# Patient Record
Sex: MALE | Race: WHITE
[De-identification: names, ages, dates, MRNs, and addresses within clinical notes are randomized per-mention and may not be internally consistent; named-entity substitution may affect disease eponyms.]

---

## 2019-01-02 ENCOUNTER — HOSPITAL ENCOUNTER (OUTPATIENT)
Dept: HOSPITAL 47 - RADMRIMAIN | Age: 79
End: 2019-01-02
Attending: INTERNAL MEDICINE
Payer: MEDICARE

## 2019-01-02 DIAGNOSIS — M43.16: ICD-10-CM

## 2019-01-02 DIAGNOSIS — M48.061: Primary | ICD-10-CM

## 2019-01-02 DIAGNOSIS — M99.73: ICD-10-CM

## 2019-01-02 DIAGNOSIS — M51.27: ICD-10-CM

## 2019-01-02 PROCEDURE — 72148 MRI LUMBAR SPINE W/O DYE: CPT

## 2019-01-02 NOTE — MR
EXAMINATION TYPE: MR lumbar spine wo con

 

DATE OF EXAM: 1/2/2019

 

COMPARISON: None

 

HISTORY: Spinal stenosis, lumbar region, pain

 

CONTRAST: 0 mL intravenous Gadavist.

 

TECHNIQUE: 

Multiplanar, multisequence images of the lumbar spine were acquired.

 

FINDINGS:  

L5-S1: There is a large central disc herniation with mild anterior thecal sac compression.   Spinal c
anal stenosis is present at 0.8 cm. Moderate right and severe left foraminal stenosis is present.

 

L4-L5: There is narrowing of the disc height. Very minimal disc bulge has anterior thecal sac contact
. No spinal canal stenosis present. Left facet hypertrophy is present there is moderate to severe lef
t foraminal stenosis and very mild right foraminal stenosis at this level.

 

L3-L4: Disc uncovering is present with mild anterior thecal sac compression. Facet hypertrophy with l
igamentum flavum laxity is posterior lateral thecal sac compression. There may be some lateral canal 
narrowing. Very mild grade 1 spondylolisthesis of L3 anterior and L4 is present. Posterior disc space
 narrowing is present. There is severe bilateral foraminal stenosis.

 

L2-L3: Broad-based disc bulge is present. This is slightly more focal centrally and may has some mild
 subligamentous disc extension beyond the endplate of L3. No AP spinal canal stenosis present. Poster
ior lateral thecal sac compression from facet hypertrophy and ligamentum flavum laxity is present. Fo
ramen are patent.

 

L1-L2: There is a right paracentral disc bulge which has mild anterior thecal sac compression. No spi
nal canal stenosis present. Facet hypertrophy with mild ligamentum flavum laxity is present. Neural f
oramen are patent.

 

T12-L1: No significant disc bulge or disc herniation.  No spinal canal stenosis.  No foraminal stenos
is. 

 

 

 

IMPRESSION:

1. Large central disc herniation L5-S1 with mild anterior thecal sac compression contributing to spin
al canal stenosis.

2. Moderate to severe bilateral foraminal stenosis L4-5, L5-S1 and L3-4.

3. Grade 1 spondylolisthesis of L3 anterior and L4. Disc uncovering is contributing to mild anterior 
thecal sac compression. Ligamentum flavum laxity is contributing to lateral canal narrowing.

4. Subligamentous disc extension at the L2-L3 level with mild anterior thecal sac compression.

5. Right paracentral focal bulge with mild anterior thecal sac compression.

## 2021-05-04 ENCOUNTER — HOSPITAL ENCOUNTER (OUTPATIENT)
Dept: HOSPITAL 47 - EC | Age: 81
Setting detail: OBSERVATION
LOS: 1 days | Discharge: HOME | End: 2021-05-05
Attending: INTERNAL MEDICINE | Admitting: INTERNAL MEDICINE
Payer: MEDICARE

## 2021-05-04 DIAGNOSIS — E86.1: ICD-10-CM

## 2021-05-04 DIAGNOSIS — Z81.2: ICD-10-CM

## 2021-05-04 DIAGNOSIS — Z82.5: ICD-10-CM

## 2021-05-04 DIAGNOSIS — Z79.899: ICD-10-CM

## 2021-05-04 DIAGNOSIS — Z98.890: ICD-10-CM

## 2021-05-04 DIAGNOSIS — Z20.822: ICD-10-CM

## 2021-05-04 DIAGNOSIS — K59.00: ICD-10-CM

## 2021-05-04 DIAGNOSIS — K57.90: ICD-10-CM

## 2021-05-04 DIAGNOSIS — E66.9: ICD-10-CM

## 2021-05-04 DIAGNOSIS — K92.1: Primary | ICD-10-CM

## 2021-05-04 DIAGNOSIS — Z80.0: ICD-10-CM

## 2021-05-04 DIAGNOSIS — H40.9: ICD-10-CM

## 2021-05-04 DIAGNOSIS — N39.0: ICD-10-CM

## 2021-05-04 DIAGNOSIS — H26.9: ICD-10-CM

## 2021-05-04 DIAGNOSIS — N40.1: ICD-10-CM

## 2021-05-04 LAB
ALBUMIN SERPL-MCNC: 3.8 G/DL (ref 3.5–5)
ALP SERPL-CCNC: 122 U/L (ref 38–126)
ALT SERPL-CCNC: 28 U/L (ref 4–49)
ANION GAP SERPL CALC-SCNC: 8 MMOL/L
APTT BLD: 21.6 SEC (ref 22–30)
AST SERPL-CCNC: 29 U/L (ref 17–59)
BASOPHILS # BLD AUTO: 0 K/UL (ref 0–0.2)
BASOPHILS NFR BLD AUTO: 0 %
BUN SERPL-SCNC: 14 MG/DL (ref 9–20)
CALCIUM SPEC-MCNC: 9.9 MG/DL (ref 8.4–10.2)
CHLORIDE SERPL-SCNC: 106 MMOL/L (ref 98–107)
CO2 SERPL-SCNC: 25 MMOL/L (ref 22–30)
EOSINOPHIL # BLD AUTO: 0.4 K/UL (ref 0–0.7)
EOSINOPHIL NFR BLD AUTO: 4 %
ERYTHROCYTE [DISTWIDTH] IN BLOOD BY AUTOMATED COUNT: 4.66 M/UL (ref 4.3–5.9)
ERYTHROCYTE [DISTWIDTH] IN BLOOD: 14.6 % (ref 11.5–15.5)
GLUCOSE SERPL-MCNC: 101 MG/DL (ref 74–99)
HCT VFR BLD AUTO: 40.7 % (ref 39–53)
HGB BLD-MCNC: 14 GM/DL (ref 13–17.5)
INR PPP: 1 (ref ?–1.2)
LYMPHOCYTES # SPEC AUTO: 1.6 K/UL (ref 1–4.8)
LYMPHOCYTES NFR SPEC AUTO: 15 %
MCH RBC QN AUTO: 30 PG (ref 25–35)
MCHC RBC AUTO-ENTMCNC: 34.4 G/DL (ref 31–37)
MCV RBC AUTO: 87.3 FL (ref 80–100)
MONOCYTES # BLD AUTO: 0.6 K/UL (ref 0–1)
MONOCYTES NFR BLD AUTO: 5 %
NEUTROPHILS # BLD AUTO: 8 K/UL (ref 1.3–7.7)
NEUTROPHILS NFR BLD AUTO: 75 %
PLATELET # BLD AUTO: 222 K/UL (ref 150–450)
POTASSIUM SERPL-SCNC: 4.4 MMOL/L (ref 3.5–5.1)
PROT SERPL-MCNC: 6.6 G/DL (ref 6.3–8.2)
PT BLD: 10.6 SEC (ref 9–12)
SODIUM SERPL-SCNC: 139 MMOL/L (ref 137–145)
WBC # BLD AUTO: 10.8 K/UL (ref 3.8–10.6)

## 2021-05-04 PROCEDURE — 85610 PROTHROMBIN TIME: CPT

## 2021-05-04 PROCEDURE — 85025 COMPLETE CBC W/AUTO DIFF WBC: CPT

## 2021-05-04 PROCEDURE — 36415 COLL VENOUS BLD VENIPUNCTURE: CPT

## 2021-05-04 PROCEDURE — 99285 EMERGENCY DEPT VISIT HI MDM: CPT

## 2021-05-04 PROCEDURE — 96374 THER/PROPH/DIAG INJ IV PUSH: CPT

## 2021-05-04 PROCEDURE — 85730 THROMBOPLASTIN TIME PARTIAL: CPT

## 2021-05-04 PROCEDURE — 82272 OCCULT BLD FECES 1-3 TESTS: CPT

## 2021-05-04 PROCEDURE — 86901 BLOOD TYPING SEROLOGIC RH(D): CPT

## 2021-05-04 PROCEDURE — 86850 RBC ANTIBODY SCREEN: CPT

## 2021-05-04 PROCEDURE — 87636 SARSCOV2 & INF A&B AMP PRB: CPT

## 2021-05-04 PROCEDURE — 80053 COMPREHEN METABOLIC PANEL: CPT

## 2021-05-04 PROCEDURE — 86900 BLOOD TYPING SEROLOGIC ABO: CPT

## 2021-05-04 PROCEDURE — 83605 ASSAY OF LACTIC ACID: CPT

## 2021-05-04 PROCEDURE — 84484 ASSAY OF TROPONIN QUANT: CPT

## 2021-05-04 PROCEDURE — 96361 HYDRATE IV INFUSION ADD-ON: CPT

## 2021-05-04 RX ADMIN — CEFAZOLIN SCH: 330 INJECTION, POWDER, FOR SOLUTION INTRAMUSCULAR; INTRAVENOUS at 15:35

## 2021-05-04 RX ADMIN — LACTULOSE SCH GM: 20 SOLUTION ORAL at 17:32

## 2021-05-04 RX ADMIN — LACTULOSE SCH: 20 SOLUTION ORAL at 21:32

## 2021-05-04 RX ADMIN — LACTULOSE SCH GM: 20 SOLUTION ORAL at 21:30

## 2021-05-04 RX ADMIN — HYDROCORTISONE ACETATE SCH MG: 25 SUPPOSITORY RECTAL at 11:40

## 2021-05-04 RX ADMIN — PANTOPRAZOLE SODIUM SCH MG: 40 INJECTION, POWDER, FOR SOLUTION INTRAVENOUS at 08:48

## 2021-05-04 NOTE — P.CONS
History of Present Illness





- Reason for Consult


Consult date: 05/04/21


Lower GI bleed


Requesting physician: Josse Powers





- Chief Complaint


Rectal bleeding





- History of Present Illness





This is a pleasant 81-year-old white male presented to the emergency room after 

he had 2 episodes of bright red blood per rectum after having a bowel movement. 

Patient states he has problems with constipation due to enlarged prostate.  He 

states he has to really push to have a bowel movement and yesterday he gave 

himself an enema followed by his daughter giving him castor oil to have a bowel 

movement.  He states he had 2 bowel movements and then later noted when he sat 

on the toilet he had a small amount of bright red blood dripping in the toilet 

that stopped after wiping.  He states he Got up at 2 AM to Go to the Bathroom 

and Again Saw a Small Amount of Bleeding That Stopped Right Away.  He eats the 

bleeding was painless, had no abdominal cramping or pain.  No pain in the 

rectum.  Has a history of diverticulitis for which he underwent a colonoscopy in

2019 at [facility] with Dr. Bernard, report not available at this time.  Eyes 

any anticoagulation or NSAID use.  He is currently on Keflex for which Dr. Melo

has prescribed him for a urinary tract infection as well as watching his 

prostate.  His Hemoccult stool was negative and his presenting labs were WBC 

10.8, hemoglobin 14.0, hematocrit 40.0, platelets 222,000 liver enzymes 

unremarkable, BUN and creatinine normal limits.





Review of Systems


Constitutional: Denies chills, Denies fever


Ears, nose, mouth and throat: Denies headache, Denies sore throat


Cardiovascular: Denies chest pain, Denies shortness of breath


Respiratory: Denies cough


Gastrointestinal: Reports BRBPR, Reports change in bowel habits, Reports 

constipation, Denies abdominal pain, Denies bloating, Denies coffee ground 

emesis, Denies melena, Denies nausea, Denies vomiting


Genitourinary: Reports urinary hesitancy, Reports urinary retention


Musculoskeletal: Denies myalgias


Integumentary: Denies pruritus, Denies rash


Neurological: Denies numbness, Denies weakness





Past Medical History


Past Medical History: No Reported History, Eye Disorder


Additional Past Medical History / Comment(s): Pt states he had recent 

hospitalization d/t prostate/urinary flow issues and UTI/hematuria-pt states he 

had IDC x 11 days and will be following up with Dr. Stroud, BPH, diverticular dx,

bilateral eye cataracts/glaucoma.


History of Any Multi-Drug Resistant Organisms: None Reported


Past Surgical History: Back Surgery, Tonsillectomy


Additional Past Surgical History / Comment(s): Lumbar spine surgery with Coflex 

clip, colonoscopy


Past Anesthesia/Blood Transfusion Reactions: No Reported Reaction


Smoking Status: Never smoker





- Past Family History


  ** Father


Family Medical History: COPD


Additional Family Medical History / Comment(s): Father was a smoker. He lived t 

be 83 yrs old.





  ** Mother


Family Medical History: Cancer


Additional Family Medical History / Comment(s): Colon cancer.  Mother is 98yrs 

old.





Medications and Allergies


                                Home Medications











 Medication  Instructions  Recorded  Confirmed  Type


 


Cephalexin [Keflex] 500 mg PO BID 05/04/21 05/04/21 History


 


Latanoprost/Pf [Latanoprost 0.005% 1 drop BOTH EYES HS 05/04/21 05/04/21 History





Eye Drop]    








                                    Allergies











Allergy/AdvReac Type Severity Reaction Status Date / Time


 


No Known Allergies Allergy   Verified 05/04/21 07:21














Physical Exam


Vitals: 


                                   Vital Signs











  Temp Pulse Resp BP Pulse Ox


 


 05/04/21 11:21  98.0 F  86  18  132/84  95


 


 05/04/21 08:52   94  18  130/74  98


 


 05/04/21 07:00   97  18  128/62  99


 


 05/04/21 03:30  97.8 F  117 H  20  130/65  96








                                Intake and Output











 05/03/21 05/04/21 05/04/21





 22:59 06:59 14:59


 


Other:   


 


  Weight  77.111 kg 77.111 kg














General appearance: The patient is alert, oriented, in no acute distress.


HET: Head is normocephalic and atraumatic.    Oropharynx is clear without 

lesions.


Neck: Supple without lymphadenopathy.  Trachea midline.


Heart: S1 S2.  Regular rate and rhythm.


Lungs: They're to auscultation.


Abdomen: Soft, nontender, nondistended with  bowel sounds.  No peritoneal signs.

 No palpable organomegaly or masses.


Extremities: Normal skin color and turgor.  No pedal edema


Neurological: No focal deficits.  Alert and oriented 3.





Results


CBC & Chem 7: 


                                 05/04/21 04:40





                                 05/04/21 04:40


Labs: 


                  Abnormal Lab Results - Last 24 Hours (Table)











  05/04/21 05/04/21 05/04/21 Range/Units





  04:40 04:40 04:40 


 


WBC  10.8 H    (3.8-10.6)  k/uL


 


Neutrophils #  8.0 H    (1.3-7.7)  k/uL


 


APTT   21.6 L   (22.0-30.0)  sec


 


Glucose    101 H  (74-99)  mg/dL














Assessment and Plan


(1) Lower GI bleeding


Narrative/Plan: 


This is a pleasant 81-year-old male patient who presented to the emergency room 

with complaints of 2 episodes of bleeding from the rectum which he states was 

darker in color.  He states he has been suffering from constipation and has to 

struggle and strain to have a bowel movement.  Yesterday he gave himself an 

enema for which he states he had a bowel movement however had to strain 

significantly.  His daughter then came over and gave him some castor oil which 

he also had another bowel movement.  He denies any blood being mixed in with the

stool.  Later that day felt like he had to go the bathroom again and when he sat

down on the toilet he noticed a couple small drops of blood in the toilet which 

he states was dark, this again happened at 2 AM.  He states that it is painless,

he denied any abdominal pain or cramping.  He does also state that he has a 

history of diverticulitis for which he underwent a colonoscopy in 2019 by Dr. Bernard, however the report is not available at this time.  He had a stable 

hemoglobin of 14.0 as well as a negative occult stool.  We dealing with 

hemorrhoidal were fissure due to straining and constipation.  However cannot 

exclude possibility of diverticular bleed with patient's history of 

diverticulosis.


Current Visit: Yes   Status: Acute   Code(s): K92.2 - GASTROINTESTINAL 

HEMORRHAGE, UNSPECIFIED   SNOMED Code(s): 36711903


   


Plan: 





1.  Patient may have regular diet


2.  Repeat CBC in the a.m.


3.  Will order Anusol suppository


4.  Discussed using MiraLAX daily and up to twice daily as needed for 

constipation


5.  No plans on endoscopic evaluation at this time patient had recent col

onoscopy, can try to get records.  


6.  Recommend patient have follow-up with gastroenterology regarding management 

of chronic constipation


Thank you for this consultation, we will continue to follow.  Patient may be 

discharged home from a gastroenterology standpoint if otherwise deemed medically

clear.





Dr. RAYNE Byrd


I agree with the dictator's note, documented as a scribe by Antonia TOUSSAINT.

## 2021-05-04 NOTE — P.HPIM
History of Present Illness


H&P Date: 05/04/21


Chief Complaint: Hematochezia


This is an 81-year-old white male who reported to the hospital because of 

passing blood in the stool.  He describes the blood as dark and it happened 

twice since last night.  He denies dizziness or loss of consciousness.  He 

denies chest pain, no abdominal pain, no hematuria or dysuria.  He denies 

subjective fever or chills.  At the time of examination patient is in bed, he 

does not appear to be in distress.  He denies chest pain.








Review of Systems


Pertinent positive and negative findings as in HPI.  No chest pain, no abdominal

pain.  Positive for hematochezia.








Past Medical History


Past Medical History: No Reported History, Eye Disorder


Additional Past Medical History / Comment(s): Pt states he had recent 

hospitalization d/t prostate/urinary flow issues and UTI/hematuria-pt states he 

had IDC x 11 days and will be following up with Dr. Stroud, BPH, diverticular dx,

bilateral eye cataracts/glaucoma.


History of Any Multi-Drug Resistant Organisms: None Reported


Past Surgical History: Back Surgery, Tonsillectomy


Additional Past Surgical History / Comment(s): Lumbar spine surgery with Coflex 

clip, colonoscopy


Past Anesthesia/Blood Transfusion Reactions: No Reported Reaction


Smoking Status: Never smoker





- Past Family History


  ** Father


Family Medical History: COPD


Additional Family Medical History / Comment(s): Father was a smoker. He lived t 

be 83 yrs old.





  ** Mother


Family Medical History: Cancer


Additional Family Medical History / Comment(s): Colon cancer.  Mother is 98yrs 

old.





Medications and Allergies


                                Home Medications











 Medication  Instructions  Recorded  Confirmed  Type


 


Cephalexin [Keflex] 500 mg PO BID 05/04/21 05/04/21 History


 


Latanoprost/Pf [Latanoprost 0.005% 1 drop BOTH EYES HS 05/04/21 05/04/21 History





Eye Drop]    








                                    Allergies











Allergy/AdvReac Type Severity Reaction Status Date / Time


 


No Known Allergies Allergy   Verified 05/04/21 07:21














Physical Exam


Vitals: 


                                   Vital Signs











  Temp Pulse Resp BP Pulse Ox


 


 05/04/21 11:21  98.0 F  86  18  132/84  95


 


 05/04/21 08:52   94  18  130/74  98


 


 05/04/21 07:00   97  18  128/62  99


 


 05/04/21 03:30  97.8 F  117 H  20  130/65  96








                                Intake and Output











 05/03/21 05/04/21 05/04/21





 22:59 06:59 14:59


 


Other:   


 


  Weight  77.111 kg 77.111 kg











Constitutional: No acute distress, conversant, pleasant


Eyes: Anicteric sclerae, moist conjunctiva


ENMT: NC/AT,Oropharynx clear


Neck:Supple, FROM


Lungs: Clear to auscultation, Clear to percussion, Normal respiratory effort, no

accessory muscle use 


Cardiovascular: Heart regular in rate and rhythm,  No murmurs, gallops, or rubs 

no peripheral edema


Abdominal: Soft Nontender, non distended, no guarding, no rebound or  rigidity, 

Normoactive bowel sounds   No palpable mass No abdominal wall hernia noted 


Skin: Normal temperature, tone, texture, turgor, No induration No subcutaneous 

nodules, No rash, lesions, No ulcers


Extremities:No digital cyanosis No clubbing, Pedal pulses intact and  

symmetrical Radial pulses intact and symmetrical Normal gait and station, No 

calf tenderness


Psychiatric: Alert and oriented to person, place and time, Appropriate affect 

Intact judgement         


Neuro: Muscles Strength 5/5 in all 4 extremities, Sensation to light touch 

grossly present throughout, Cranial nerves II-XII grossly intact. No focal 

sensory deficits








Results


CBC & Chem 7: 


                                 05/04/21 04:40





                                 05/04/21 04:40


Labs: 


                  Abnormal Lab Results - Last 24 Hours (Table)











  05/04/21 05/04/21 05/04/21 Range/Units





  04:40 04:40 04:40 


 


WBC  10.8 H    (3.8-10.6)  k/uL


 


Neutrophils #  8.0 H    (1.3-7.7)  k/uL


 


APTT   21.6 L   (22.0-30.0)  sec


 


Glucose    101 H  (74-99)  mg/dL














Thrombosis Risk Factor Assmnt





- Choose All That Apply


Any of the Below Risk Factors Present?: Yes


Each Factor Represents 1 point: Obesity (BMI >25)


Other Risk Factors: Yes


Each Risk Factor Represents 3 Points: Age 75 years or older


Thrombosis Risk Factor Assessment Total Risk Factor Score: 4


Thrombosis Risk Factor Assessment Level: Moderate Risk





Assessment and Plan


Assessment: 


1.  Acute GI bleed, likely lower, unknown etiology, monitor H&H, hemoglobin 14, 

transfuse as indicated.  Start PPI and IV fluids, GI consultation.  Patient 

states that he had a colonoscopy in 2019.


2.  Hypovolemia: Start normal saline at 75 mL/h


DVT prophylaxis: SCDs


Disposition: Home in 1-2 days

## 2021-05-04 NOTE — ED
GI Bleed HPI





- General


Chief complaint: Recheck/Abnormal Lab/Rx


Stated complaint: rectal bleeding


Time Seen by Provider: 05/04/21 03:39


Source: patient


Mode of arrival: ambulatory


Limitations: no limitations





- History of Present Illness


Initial comments: 





this patient is an 81-year-old man who presents to be evaluated for passing some

dark red blood with his stool.  The patient states that this it come on tonight.

 He had been having some some difficulty with constipation after recently being 

in the hospital for prostate issues.  He states that he had taken a number of 

remedies to help have bowel movement and then when he did pass some stool tonigh

t there was some dark blood present with hard stool.  He also had what appeared 

to be some blood mixed in the stool.  Patient states there was no pain in the 

abdomen.  No perianal pain.  He denies any signs or symptoms of anemia.  No 

chest pain, palpitations, lightheadedness, dyspnea or syncope.  Patient is not 

taking any blood thinning medications.


MD complaint: blood streaked stool


-: hour(s)


Severity scale (1-10): 0


Quality: painless


Consistency: constant


Improves with: none


Worsens with: none


Associated Symptoms: denies other symptoms





- Related Data


                                    Allergies











Allergy/AdvReac Type Severity Reaction Status Date / Time


 


No Known Allergies Allergy   Verified 05/04/21 03:32














Review of Systems


ROS Statement: 


Those systems with pertinent positive or pertinent negative responses have been 

documented in the HPI.





ROS Other: All systems not noted in ROS Statement are negative.


Constitutional: Denies: fever, chills


Respiratory: Denies: cough, dyspnea


Cardiovascular: Denies: chest pain, palpitations, edema


Gastrointestinal: Reports: constipation, melena.  Denies: abdominal pain, 

nausea, vomiting, diarrhea, hematemesis, hematochezia


Genitourinary: Denies: dysuria, hematuria, testicular pain


Musculoskeletal: Denies: back pain


Skin: Denies: rash


Neurological: Denies: headache, weakness, numbness





Past Medical History


Past Medical History: No Reported History


History of Any Multi-Drug Resistant Organisms: None Reported


Past Surgical History: No Surgical Hx Reported


Past Psychological History: No Psychological Hx Reported


Smoking Status: Never smoker


Past Alcohol Use History: None Reported


Past Drug Use History: None Reported





General Exam


Limitations: no limitations


General appearance: alert, in no apparent distress


Head exam: Present: atraumatic, normocephalic


Eye exam: Present: normal appearance.  Absent: scleral icterus, conjunctival 

injection


ENT exam: Present: normal oropharynx


Neck exam: Present: normal inspection


Respiratory exam: Present: normal lung sounds bilaterally.  Absent: respiratory 

distress, wheezes, rales, rhonchi, stridor


Cardiovascular Exam: Present: regular rate, normal rhythm, normal heart sounds. 

Absent: systolic murmur, diastolic murmur, rubs, gallop


GI/Abdominal exam: Present: soft.  Absent: distended, tenderness, guarding, 

rebound, rigid, mass, pulsatile mass, hernia


Rectal exam: Present: normal inspection, normal rectal tone.  Absent: black 

stool, bloody stool, fecal impaction, hemorrhoids, mass, tenderness, prostate 

tenderness


Extremities exam: Present: normal inspection, normal capillary refill.  Absent: 

pedal edema, calf tenderness


Back exam: Present: normal inspection.  Absent: CVA tenderness (R), CVA 

tenderness (L)


Neurological exam: Present: alert


Skin exam: Present: warm, dry, intact, normal color.  Absent: rash





Course


                                   Vital Signs











  05/04/21 05/04/21





  03:30 07:00


 


Temperature 97.8 F 


 


Pulse Rate 117 H 97


 


Respiratory 20 18





Rate  


 


Blood Pressure 130/65 128/62


 


O2 Sat by Pulse 96 99





Oximetry  














Medical Decision Making





- Lab Data


Result diagrams: 


                                 05/04/21 04:40





                                 05/04/21 04:40


                                   Lab Results











  05/04/21 05/04/21 05/04/21 Range/Units





  04:40 04:40 04:40 


 


WBC  10.8 H    (3.8-10.6)  k/uL


 


RBC  4.66    (4.30-5.90)  m/uL


 


Hgb  14.0    (13.0-17.5)  gm/dL


 


Hct  40.7    (39.0-53.0)  %


 


MCV  87.3    (80.0-100.0)  fL


 


MCH  30.0    (25.0-35.0)  pg


 


MCHC  34.4    (31.0-37.0)  g/dL


 


RDW  14.6    (11.5-15.5)  %


 


Plt Count  222    (150-450)  k/uL


 


MPV  7.6    


 


Neutrophils %  75    %


 


Lymphocytes %  15    %


 


Monocytes %  5    %


 


Eosinophils %  4    %


 


Basophils %  0    %


 


Neutrophils #  8.0 H    (1.3-7.7)  k/uL


 


Lymphocytes #  1.6    (1.0-4.8)  k/uL


 


Monocytes #  0.6    (0-1.0)  k/uL


 


Eosinophils #  0.4    (0-0.7)  k/uL


 


Basophils #  0.0    (0-0.2)  k/uL


 


PT    10.6  (9.0-12.0)  sec


 


INR    1.0  (<1.2)  


 


APTT    21.6 L  (22.0-30.0)  sec


 


Sodium     (137-145)  mmol/L


 


Potassium     (3.5-5.1)  mmol/L


 


Chloride     ()  mmol/L


 


Carbon Dioxide     (22-30)  mmol/L


 


Anion Gap     mmol/L


 


BUN     (9-20)  mg/dL


 


Creatinine     (0.66-1.25)  mg/dL


 


Est GFR (CKD-EPI)AfAm     (>60 ml/min/1.73 sqM)  


 


Est GFR (CKD-EPI)NonAf     (>60 ml/min/1.73 sqM)  


 


Glucose     (74-99)  mg/dL


 


Plasma Lactic Acid Moses     (0.7-2.0)  mmol/L


 


Calcium     (8.4-10.2)  mg/dL


 


Total Bilirubin     (0.2-1.3)  mg/dL


 


AST     (17-59)  U/L


 


ALT     (4-49)  U/L


 


Alkaline Phosphatase     ()  U/L


 


Troponin I     (0.000-0.034)  ng/mL


 


Total Protein     (6.3-8.2)  g/dL


 


Albumin     (3.5-5.0)  g/dL


 


Stool Occult Blood   Negative   (Negative)  


 


Blood Type     


 


Blood Type Confirm     


 


Blood Type Recheck     


 


Bld Type Recheck Status     


 


Antibody Screen     


 


Spec Expiration Date     














  05/04/21 05/04/21 05/04/21 Range/Units





  04:40 04:40 04:40 


 


WBC     (3.8-10.6)  k/uL


 


RBC     (4.30-5.90)  m/uL


 


Hgb     (13.0-17.5)  gm/dL


 


Hct     (39.0-53.0)  %


 


MCV     (80.0-100.0)  fL


 


MCH     (25.0-35.0)  pg


 


MCHC     (31.0-37.0)  g/dL


 


RDW     (11.5-15.5)  %


 


Plt Count     (150-450)  k/uL


 


MPV     


 


Neutrophils %     %


 


Lymphocytes %     %


 


Monocytes %     %


 


Eosinophils %     %


 


Basophils %     %


 


Neutrophils #     (1.3-7.7)  k/uL


 


Lymphocytes #     (1.0-4.8)  k/uL


 


Monocytes #     (0-1.0)  k/uL


 


Eosinophils #     (0-0.7)  k/uL


 


Basophils #     (0-0.2)  k/uL


 


PT     (9.0-12.0)  sec


 


INR     (<1.2)  


 


APTT     (22.0-30.0)  sec


 


Sodium  139    (137-145)  mmol/L


 


Potassium  4.4    (3.5-5.1)  mmol/L


 


Chloride  106    ()  mmol/L


 


Carbon Dioxide  25    (22-30)  mmol/L


 


Anion Gap  8    mmol/L


 


BUN  14    (9-20)  mg/dL


 


Creatinine  0.85    (0.66-1.25)  mg/dL


 


Est GFR (CKD-EPI)AfAm  >90    (>60 ml/min/1.73 sqM)  


 


Est GFR (CKD-EPI)NonAf  81    (>60 ml/min/1.73 sqM)  


 


Glucose  101 H    (74-99)  mg/dL


 


Plasma Lactic Acid Moses   1.3   (0.7-2.0)  mmol/L


 


Calcium  9.9    (8.4-10.2)  mg/dL


 


Total Bilirubin  1.0    (0.2-1.3)  mg/dL


 


AST  29    (17-59)  U/L


 


ALT  28    (4-49)  U/L


 


Alkaline Phosphatase  122    ()  U/L


 


Troponin I    <0.012  (0.000-0.034)  ng/mL


 


Total Protein  6.6    (6.3-8.2)  g/dL


 


Albumin  3.8    (3.5-5.0)  g/dL


 


Stool Occult Blood     (Negative)  


 


Blood Type     


 


Blood Type Confirm     


 


Blood Type Recheck     


 


Bld Type Recheck Status     


 


Antibody Screen     


 


Spec Expiration Date     














  05/04/21 05/04/21 Range/Units





  04:40 05:49 


 


WBC    (3.8-10.6)  k/uL


 


RBC    (4.30-5.90)  m/uL


 


Hgb    (13.0-17.5)  gm/dL


 


Hct    (39.0-53.0)  %


 


MCV    (80.0-100.0)  fL


 


MCH    (25.0-35.0)  pg


 


MCHC    (31.0-37.0)  g/dL


 


RDW    (11.5-15.5)  %


 


Plt Count    (150-450)  k/uL


 


MPV    


 


Neutrophils %    %


 


Lymphocytes %    %


 


Monocytes %    %


 


Eosinophils %    %


 


Basophils %    %


 


Neutrophils #    (1.3-7.7)  k/uL


 


Lymphocytes #    (1.0-4.8)  k/uL


 


Monocytes #    (0-1.0)  k/uL


 


Eosinophils #    (0-0.7)  k/uL


 


Basophils #    (0-0.2)  k/uL


 


PT    (9.0-12.0)  sec


 


INR    (<1.2)  


 


APTT    (22.0-30.0)  sec


 


Sodium    (137-145)  mmol/L


 


Potassium    (3.5-5.1)  mmol/L


 


Chloride    ()  mmol/L


 


Carbon Dioxide    (22-30)  mmol/L


 


Anion Gap    mmol/L


 


BUN    (9-20)  mg/dL


 


Creatinine    (0.66-1.25)  mg/dL


 


Est GFR (CKD-EPI)AfAm    (>60 ml/min/1.73 sqM)  


 


Est GFR (CKD-EPI)NonAf    (>60 ml/min/1.73 sqM)  


 


Glucose    (74-99)  mg/dL


 


Plasma Lactic Acid Moses    (0.7-2.0)  mmol/L


 


Calcium    (8.4-10.2)  mg/dL


 


Total Bilirubin    (0.2-1.3)  mg/dL


 


AST    (17-59)  U/L


 


ALT    (4-49)  U/L


 


Alkaline Phosphatase    ()  U/L


 


Troponin I    (0.000-0.034)  ng/mL


 


Total Protein    (6.3-8.2)  g/dL


 


Albumin    (3.5-5.0)  g/dL


 


Stool Occult Blood    (Negative)  


 


Blood Type  O Positive   


 


Blood Type Confirm   O Positive  


 


Blood Type Recheck  No Previous Record   


 


Bld Type Recheck Status  CABO Indicated   


 


Antibody Screen  NEGATIVE   


 


Spec Expiration Date  05/07/2021 - 2340   














Disposition


Clinical Impression: 


 Lower GI bleeding





Disposition: ADMITTED AS IP TO THIS [facility]


Condition: Good


Is patient prescribed a controlled substance at d/c from ED?: No


Referrals: 


Asaf Jefferson MD [Primary Care Provider] - 1-2 days

## 2021-05-05 VITALS — RESPIRATION RATE: 16 BRPM

## 2021-05-05 VITALS — HEART RATE: 81 BPM | DIASTOLIC BLOOD PRESSURE: 76 MMHG | SYSTOLIC BLOOD PRESSURE: 122 MMHG | TEMPERATURE: 97.6 F

## 2021-05-05 LAB
ALBUMIN SERPL-MCNC: 3.6 G/DL (ref 3.8–4.9)
ALBUMIN/GLOB SERPL: 1.71 G/DL (ref 1.6–3.17)
ALP SERPL-CCNC: 110 U/L (ref 41–126)
ALT SERPL-CCNC: 28 U/L (ref 10–49)
ANION GAP SERPL CALC-SCNC: 9.8 MMOL/L (ref 4–12)
AST SERPL-CCNC: 25 U/L (ref 14–35)
BASOPHILS # BLD AUTO: 0.02 X 10*3/UL (ref 0–0.1)
BASOPHILS NFR BLD AUTO: 0.2 %
BUN SERPL-SCNC: 17 MG/DL (ref 9–27)
BUN/CREAT SERPL: 17 RATIO (ref 12–20)
CALCIUM SPEC-MCNC: 9.3 MG/DL (ref 8.7–10.3)
CHLORIDE SERPL-SCNC: 108 MMOL/L (ref 96–109)
CO2 SERPL-SCNC: 24.2 MMOL/L (ref 21.6–31.8)
EOSINOPHIL # BLD AUTO: 0.34 X 10*3/UL (ref 0.04–0.35)
EOSINOPHIL NFR BLD AUTO: 4.1 %
ERYTHROCYTE [DISTWIDTH] IN BLOOD BY AUTOMATED COUNT: 4.41 X 10*6/UL (ref 4.4–5.6)
ERYTHROCYTE [DISTWIDTH] IN BLOOD: 14.8 % (ref 11.5–14.5)
GLOBULIN SER CALC-MCNC: 2.1 G/DL (ref 1.6–3.3)
GLUCOSE SERPL-MCNC: 81 MG/DL (ref 70–110)
HCT VFR BLD AUTO: 40.1 % (ref 39.6–50)
HGB BLD-MCNC: 12.7 G/DL (ref 13–17)
LYMPHOCYTES # SPEC AUTO: 1.59 X 10*3/UL (ref 0.9–5)
LYMPHOCYTES NFR SPEC AUTO: 19.2 %
MCH RBC QN AUTO: 28.8 PG (ref 27–32)
MCHC RBC AUTO-ENTMCNC: 31.7 G/DL (ref 32–37)
MCV RBC AUTO: 90.9 FL (ref 80–97)
MONOCYTES # BLD AUTO: 0.8 X 10*3/UL (ref 0.2–1)
MONOCYTES NFR BLD AUTO: 9.6 %
NEUTROPHILS # BLD AUTO: 5.5 X 10*3/UL (ref 1.8–7.7)
NEUTROPHILS NFR BLD AUTO: 66.3 %
PLATELET # BLD AUTO: 212 X 10*3/UL (ref 140–440)
POTASSIUM SERPL-SCNC: 4.3 MMOL/L (ref 3.5–5.5)
PROT SERPL-MCNC: 5.7 G/DL (ref 6.2–8.2)
SODIUM SERPL-SCNC: 142 MMOL/L (ref 135–145)
WBC # BLD AUTO: 8.3 X 10*3/UL (ref 4.5–10)

## 2021-05-05 RX ADMIN — LACTULOSE SCH GM: 20 SOLUTION ORAL at 08:49

## 2021-05-05 RX ADMIN — PANTOPRAZOLE SODIUM SCH: 40 INJECTION, POWDER, FOR SOLUTION INTRAVENOUS at 08:50

## 2021-05-05 RX ADMIN — CEFAZOLIN SCH: 330 INJECTION, POWDER, FOR SOLUTION INTRAMUSCULAR; INTRAVENOUS at 03:19

## 2021-05-05 RX ADMIN — HYDROCORTISONE ACETATE SCH: 25 SUPPOSITORY RECTAL at 08:50

## 2021-05-05 NOTE — P.DS
Providers


Date of admission: 


05/04/21 07:29





Expected date of discharge: 05/05/21


Attending physician: 


Rani Rowe MD





Consults: 





                                        





05/04/21 07:10


Consult Physician Routine 


   Consulting Provider: Marge Byrd


   Consult Reason/Comments: lower GI Bleeding


   Do you want consulting provider notified?: Yes











Primary care physician: 


Asaf Jefferson MD





Hospital Course: 


HPI: 


This is an 81-year-old white male who reported to the hospital because of 

passing blood in the stool.  He describes the blood as dark and it happened 

twice since last night.  He denies dizziness or loss of consciousness.  He 

denies chest pain, no abdominal pain, no hematuria or dysuria.  He denies 

subjective fever or chills.  At the time of examination patient is in bed, he 

does not appear to be in distress.  He denies chest pain.





Hospital course and treatment:


Patient was admitted to hospital with blood in the stool, he remained 

hemodynamically stable.  He was started on IV fluids and IV Protonix.  No fur

ther bleeding while in the hospital.  He was evaluated by GI, no endoscopy is 

needed at this time.  Since patient remained hemodynamically stable with no 

further bleeding he was cleared for discharge by GI.  They will see him as an 

outpatient.  They will follow up with him regarding chronic constipation 

management.





Patient Condition at Discharge: Stable





Plan - Discharge Summary


Discharge Rx Participant: No


New Discharge Prescriptions: 


Continue


   Cephalexin [Keflex] 500 mg PO BID


   Latanoprost/Pf [Latanoprost 0.005% Eye Drop] 1 drop BOTH EYES HS


Discharge Medication List





Cephalexin [Keflex] 500 mg PO BID 05/04/21 [History]


Latanoprost/Pf [Latanoprost 0.005% Eye Drop] 1 drop BOTH EYES HS 05/04/21 

[History]








Follow up Appointment(s)/Referral(s): 


Elizabeth Bergeron PAC [REFERRING] - 2 Weeks


Asaf Jefferson MD [Primary Care Provider] - 1-2 days


Discharge Disposition: HOME SELF-CARE

## 2021-10-18 ENCOUNTER — HOSPITAL ENCOUNTER (EMERGENCY)
Dept: HOSPITAL 47 - EC | Age: 81
Discharge: HOME | End: 2021-10-18
Payer: MEDICARE

## 2021-10-18 VITALS — DIASTOLIC BLOOD PRESSURE: 95 MMHG | HEART RATE: 77 BPM | SYSTOLIC BLOOD PRESSURE: 177 MMHG

## 2021-10-18 VITALS — TEMPERATURE: 98 F | RESPIRATION RATE: 18 BRPM

## 2021-10-18 DIAGNOSIS — N39.0: Primary | ICD-10-CM

## 2021-10-18 LAB
ANION GAP SERPL CALC-SCNC: 10 MMOL/L
BASOPHILS # BLD AUTO: 0 K/UL (ref 0–0.2)
BASOPHILS NFR BLD AUTO: 0 %
BUN SERPL-SCNC: 17 MG/DL (ref 9–20)
CALCIUM SPEC-MCNC: 9.7 MG/DL (ref 8.4–10.2)
CHLORIDE SERPL-SCNC: 111 MMOL/L (ref 98–107)
CO2 SERPL-SCNC: 22 MMOL/L (ref 22–30)
EOSINOPHIL # BLD AUTO: 0.7 K/UL (ref 0–0.7)
EOSINOPHIL NFR BLD AUTO: 8 %
ERYTHROCYTE [DISTWIDTH] IN BLOOD BY AUTOMATED COUNT: 5.16 M/UL (ref 4.3–5.9)
ERYTHROCYTE [DISTWIDTH] IN BLOOD: 13.9 % (ref 11.5–15.5)
GLUCOSE SERPL-MCNC: 103 MG/DL (ref 74–99)
HCT VFR BLD AUTO: 46.8 % (ref 39–53)
HGB BLD-MCNC: 15.8 GM/DL (ref 13–17.5)
LYMPHOCYTES # SPEC AUTO: 2.8 K/UL (ref 1–4.8)
LYMPHOCYTES NFR SPEC AUTO: 33 %
MCH RBC QN AUTO: 30.6 PG (ref 25–35)
MCHC RBC AUTO-ENTMCNC: 33.7 G/DL (ref 31–37)
MCV RBC AUTO: 90.8 FL (ref 80–100)
MONOCYTES # BLD AUTO: 0.5 K/UL (ref 0–1)
MONOCYTES NFR BLD AUTO: 6 %
NEUTROPHILS # BLD AUTO: 4.3 K/UL (ref 1.3–7.7)
NEUTROPHILS NFR BLD AUTO: 51 %
PLATELET # BLD AUTO: 187 K/UL (ref 150–450)
POTASSIUM SERPL-SCNC: 4 MMOL/L (ref 3.5–5.1)
RBC UR QL: >182 /HPF (ref 0–5)
SODIUM SERPL-SCNC: 143 MMOL/L (ref 137–145)
WBC # BLD AUTO: 8.5 K/UL (ref 3.8–10.6)
WBC # UR AUTO: >182 /HPF (ref 0–5)

## 2021-10-18 PROCEDURE — 99284 EMERGENCY DEPT VISIT MOD MDM: CPT

## 2021-10-18 PROCEDURE — 87086 URINE CULTURE/COLONY COUNT: CPT

## 2021-10-18 PROCEDURE — 74176 CT ABD & PELVIS W/O CONTRAST: CPT

## 2021-10-18 PROCEDURE — 80048 BASIC METABOLIC PNL TOTAL CA: CPT

## 2021-10-18 PROCEDURE — 36415 COLL VENOUS BLD VENIPUNCTURE: CPT

## 2021-10-18 PROCEDURE — 96361 HYDRATE IV INFUSION ADD-ON: CPT

## 2021-10-18 PROCEDURE — 85025 COMPLETE CBC W/AUTO DIFF WBC: CPT

## 2021-10-18 PROCEDURE — 81001 URINALYSIS AUTO W/SCOPE: CPT

## 2021-10-18 PROCEDURE — 96360 HYDRATION IV INFUSION INIT: CPT

## 2021-10-18 NOTE — ED
Male Urogenital HPI





- General


Chief complaint: Urogenital


Stated complaint: Urogenital


Source: patient, RN notes reviewed


Mode of arrival: ambulatory


Limitations: no limitations





- History of Present Illness


Initial comments: 


Patient 81-year-old male present to the ER for hematuria exacerbation this 

morning.  Patient states that he was hospitalized in april for covid and well 

was under care was catheterized 14 times in the hospital.  Patient states that 

since this has had episodic hematuria this morning was different with more red 

tinting of urine.  Patient denies any flank pain or tenderness and is 

comfortable sitting in room, concerned about color and lack of knowing what's 

happening.  Patient states he dropped off urinary urology on Monday but hasn't 

heard back from them yet.  Patient denies any pain with urination.  Patient 

reports complete emptying and no change in frequency at this time.  Patient does

have a history of enlarged prostate patient states that they have followed in 

the past and had biopsies at.








- Related Data


                                Home Medications











 Medication  Instructions  Recorded  Confirmed


 


Cephalexin [Keflex] 500 mg PO BID 05/04/21 05/04/21


 


Latanoprost/Pf [Latanoprost 0.005% 1 drop BOTH EYES HS 05/04/21 05/04/21





Eye Drop]   








                                  Previous Rx's











 Medication  Instructions  Recorded


 


Sulfamethox-Tmp 800-160Mg [Bactrim 1 each PO Q12HR #20 tab 10/18/21





Ds]  











                                    Allergies











Allergy/AdvReac Type Severity Reaction Status Date / Time


 


No Known Allergies Allergy   Verified 10/18/21 06:14














Review of Systems


ROS Statement: 


Those systems with pertinent positive or pertinent negative responses have been 

documented in the HPI.





ROS Other: All systems not noted in ROS Statement are negative.





Past Medical History


Past Medical History: Eye Disorder


Additional Past Medical History / Comment(s): Pt states he had recent 

hospitalization d/t prostate/urinary flow issues and UTI/hematuria-pt states he 

had IDC x 11 days and will be following up with Dr. Stroud, BPH, diverticular dx,

bilateral eye cataracts/glaucoma.


History of Any Multi-Drug Resistant Organisms: None Reported


Past Surgical History: Back Surgery, Tonsillectomy


Additional Past Surgical History / Comment(s): Lumbar spine surgery with Coflex 

clip, colonoscopy


Past Anesthesia/Blood Transfusion Reactions: No Reported Reaction


Past Psychological History: No Psychological Hx Reported


Smoking Status: Never smoker


Past Alcohol Use History: None Reported


Past Drug Use History: None Reported





- Past Family History


  ** Father


Family Medical History: COPD


Additional Family Medical History / Comment(s): Father was a smoker. He lived t 

be 83 yrs old.





  ** Mother


Family Medical History: Cancer


Additional Family Medical History / Comment(s): Colon cancer.  Mother is 98yrs 

old.





General Exam


General appearance: alert, in no apparent distress


Respiratory exam: Present: normal lung sounds bilaterally.  Absent: respiratory 

distress, wheezes, rales, rhonchi, stridor


Cardiovascular Exam: Present: regular rate, normal rhythm, normal heart sounds. 

Absent: systolic murmur, diastolic murmur, rubs, gallop, clicks


GI/Abdominal exam: Present: soft, normal bowel sounds.  Absent: distended, 

tenderness, guarding, rebound, rigid


Back exam: Present: normal inspection


Neurological exam: Present: alert, oriented X3


Skin exam: Present: warm, dry, intact, normal color.  Absent: rash





Course


                                   Vital Signs











  10/18/21





  06:12


 


Temperature 98 F


 


Pulse Rate 100


 


Respiratory 18





Rate 


 


Blood Pressure 147/69


 


O2 Sat by Pulse 98





Oximetry 














Medical Decision Making





- Medical Decision Making


Patient patient presents for hematuria, CT imaging shows enlargement of 

prostate, concerns for malignant process of prostate.  Prior urinalysis showed 

bacterial growth, will be sent home on antibiotics for treatment of urinary 

tract infection.  Patient counseled on the importance of urology follow-up.  

Patient counseled on return parameters.  Patient states that he's had his 

prostate biopsies in the past and was told was noncancerous though his prostate 

is very enlarged, offered Candelario if he cannot urinate patient declines.  Follow-

up with urology








- Differential Diagnosis


UTI





- Lab Data


Result diagrams: 


                                 10/18/21 06:27





                                 10/18/21 06:27


                                   Lab Results











  10/18/21 10/18/21 10/18/21 Range/Units





  06:27 06:27 06:27 


 


WBC  8.5    (3.8-10.6)  k/uL


 


RBC  5.16    (4.30-5.90)  m/uL


 


Hgb  15.8    (13.0-17.5)  gm/dL


 


Hct  46.8    (39.0-53.0)  %


 


MCV  90.8    (80.0-100.0)  fL


 


MCH  30.6    (25.0-35.0)  pg


 


MCHC  33.7    (31.0-37.0)  g/dL


 


RDW  13.9    (11.5-15.5)  %


 


Plt Count  187    (150-450)  k/uL


 


MPV  8.1    


 


Neutrophils %  51    %


 


Lymphocytes %  33    %


 


Monocytes %  6    %


 


Eosinophils %  8    %


 


Basophils %  0    %


 


Neutrophils #  4.3    (1.3-7.7)  k/uL


 


Lymphocytes #  2.8    (1.0-4.8)  k/uL


 


Monocytes #  0.5    (0-1.0)  k/uL


 


Eosinophils #  0.7    (0-0.7)  k/uL


 


Basophils #  0.0    (0-0.2)  k/uL


 


Sodium   143   (137-145)  mmol/L


 


Potassium   4.0   (3.5-5.1)  mmol/L


 


Chloride   111 H   ()  mmol/L


 


Carbon Dioxide   22   (22-30)  mmol/L


 


Anion Gap   10   mmol/L


 


BUN   17   (9-20)  mg/dL


 


Creatinine   0.87   (0.66-1.25)  mg/dL


 


Est GFR (CKD-EPI)AfAm   >90   (>60 ml/min/1.73 sqM)  


 


Est GFR (CKD-EPI)NonAf   80   (>60 ml/min/1.73 sqM)  


 


Glucose   103 H   (74-99)  mg/dL


 


Calcium   9.7   (8.4-10.2)  mg/dL


 


Urine Color    Brown  


 


Urine Appearance    Bloody  (Clear)  














Disposition


Clinical Impression: 


 Urinary tract infection





Disposition: HOME SELF-CARE


Instructions (If sedation given, give patient instructions):  Urinary Tract 

Infection in Men (ED)


Additional Instructions: 


Please return to the Emergency Department if symptoms worsen or any other 

concerns.


Prescriptions: 


Sulfamethox-Tmp 800-160Mg [Bactrim Ds] 1 each PO Q12HR #20 tab


Is patient prescribed a controlled substance at d/c from ED?: No


Referrals: 


None,Stated [Primary Care Provider] - 1-2 days


John Stroud MD [STAFF PHYSICIAN] - 1-2 days


Time of Disposition: 07:58

## 2021-10-18 NOTE — CT
EXAMINATION TYPE: CT abdomen pelvis wo con

 

DATE OF EXAM: 10/18/2021

 

COMPARISON: None

 

HISTORY: Hematuria

 

CT DLP: 723.2 mGycm

Automated exposure control for dose reduction was used.

 

TECHNIQUE:  Helical acquisition of images was performed from the lung bases through the pelvis.

 

FINDINGS: 

 

LUNG BASES: There is dependent atelectasis bilaterally.

 

LIVER/GB: No significant abnormality is appreciated.

 

PANCREAS: No significant abnormality is seen.

 

SPLEEN: Calcifications in the spleen is indicative of prior granulomatous disease.

 

ADRENALS: No significant abnormality is seen.

 

KIDNEYS: There is a 2.3 cm low-density lesion in the lower pole of the left kidney. No definite hydro
nephrosis or nephroureterolithiasis is seen.

 

FREE AIR:  No free air is visualized

 

RETROPERITONEAL ADENOPATHY:  None visualized

 

 

URINARY BLADDER/PELVIC ORGANS:  The prostate measures up to 7.8 x 8.9 x 10.6 cm and the urinary bladd
er is thick-walled.

 

PELVIC ADENOPATHY:  None visualized.

 

OSSEOUS STRUCTURES:  Degenerative changes are seen in the bones.

 

BOWEL:  No significant abnormality is seen.

 

IMPRESSION: 

 

1. 2.3 CM LOW-DENSITY LESION IN LOWER POLE OF THE LEFT KIDNEY IS INDETERMINATE ON THIS EXAMINATION. U
LTRASOUND COULD BE OBTAINED IF FURTHER EVALUATION IS CLINICALLY WARRANTED. NO HYDRONEPHROSIS OR NEPHR
OURETEROLITHIASIS IS SEEN.

2. MASSIVE ENLARGEMENT OF THE PROSTATE WITH DIFFUSE WALL THICKENING OF THE URINARY BLADDER, LIKELY DU
E TO BLADDER OUTLET OBSTRUCTION. PLEASE NOTE, CT IS INSENSITIVE FOR PROSTATE MASS.

## 2021-11-26 ENCOUNTER — HOSPITAL ENCOUNTER (OUTPATIENT)
Dept: HOSPITAL 47 - RADUSWWP | Age: 81
Discharge: HOME | End: 2021-11-26
Attending: UROLOGY
Payer: MEDICARE

## 2021-11-26 DIAGNOSIS — D41.01: ICD-10-CM

## 2021-11-26 DIAGNOSIS — N28.1: Primary | ICD-10-CM

## 2021-11-26 PROCEDURE — 76770 US EXAM ABDO BACK WALL COMP: CPT

## 2021-11-26 NOTE — US
EXAMINATION TYPE: US kidneys/renal and bladder

 

DATE OF EXAM: 11/26/2021

 

COMPARISON: CT abdomen and pelvis December 18, 2021

 

CLINICAL HISTORY: D41.01 Renal mass. Left renal lesion visualized on recent CT 

 

EXAM MEASUREMENTS:

 

Right Kidney:  10.0 x 5.4 x 4.0 cm

Left Kidney: 11.4 x 6.2 x 4.1 cm

 

 

Right Kidney: no evidence of hydronephrosis  

Left Kidney: cystic area lower pole = 2.6 x 2.4 x 2.4cm   

Bladder: not fully distended, appears wnl

**Bilateral Jets seen: no

*prostate enlarged = 7.1cm

 

 

There is no evidence for hydronephrosis at this point in time.  No nephrolithiasis is seen. Correspon
ding to CT there is partially exophytic 2.6 cm round anechoic lesion with slight increased through tr
ansmission consistent with simple thin-walled cyst thought present. The urinary bladder is not greatl
y distended. Lobulated contour with mild-to-moderate wall thickening. Markedly enlarged prostate rede
monstrated. Bilateral ureteral jets are not seen.

 

 

 

IMPRESSION: Confirmation of 2.6 cm thin-walled cyst lower pole of the left kidney. Redemonstration of
 BPH causing outlet obstruction in the urinary bladder

## 2021-12-08 ENCOUNTER — HOSPITAL ENCOUNTER (OUTPATIENT)
Dept: HOSPITAL 47 - OR | Age: 81
Discharge: HOME | End: 2021-12-08
Payer: MEDICARE

## 2021-12-08 VITALS — RESPIRATION RATE: 16 BRPM

## 2021-12-08 VITALS — DIASTOLIC BLOOD PRESSURE: 83 MMHG | HEART RATE: 71 BPM | SYSTOLIC BLOOD PRESSURE: 148 MMHG

## 2021-12-08 VITALS — BODY MASS INDEX: 26.2 KG/M2

## 2021-12-08 VITALS — TEMPERATURE: 97.3 F

## 2021-12-08 DIAGNOSIS — I10: ICD-10-CM

## 2021-12-08 DIAGNOSIS — H00.026: ICD-10-CM

## 2021-12-08 DIAGNOSIS — H25.11: Primary | ICD-10-CM

## 2021-12-08 DIAGNOSIS — H25.12: ICD-10-CM

## 2021-12-08 DIAGNOSIS — D31.31: ICD-10-CM

## 2021-12-08 DIAGNOSIS — H40.1112: ICD-10-CM

## 2021-12-08 DIAGNOSIS — H04.129: ICD-10-CM

## 2021-12-08 DIAGNOSIS — H00.023: ICD-10-CM

## 2021-12-08 DIAGNOSIS — H40.1123: ICD-10-CM

## 2021-12-08 PROCEDURE — 66982 XCAPSL CTRC RMVL CPLX WO ECP: CPT

## 2021-12-08 RX ADMIN — PHENYLEPHRINE HYDROCHLORIDE PRN DROPS: 25 SOLUTION/ DROPS OPHTHALMIC at 09:35

## 2021-12-08 RX ADMIN — CYCLOPENTOLATE HYDROCHLORIDE PRN DROPS: 10 SOLUTION/ DROPS OPHTHALMIC at 09:38

## 2021-12-08 RX ADMIN — CYCLOPENTOLATE HYDROCHLORIDE PRN DROPS: 10 SOLUTION/ DROPS OPHTHALMIC at 09:32

## 2021-12-08 RX ADMIN — CYCLOPENTOLATE HYDROCHLORIDE PRN DROPS: 10 SOLUTION/ DROPS OPHTHALMIC at 09:46

## 2021-12-08 RX ADMIN — PHENYLEPHRINE HYDROCHLORIDE PRN DROPS: 25 SOLUTION/ DROPS OPHTHALMIC at 09:49

## 2021-12-08 RX ADMIN — PHENYLEPHRINE HYDROCHLORIDE PRN DROPS: 25 SOLUTION/ DROPS OPHTHALMIC at 09:43

## 2021-12-08 NOTE — P.OP
Date of Procedure: 12/08/21


Preoperative Diagnosis: 


NS & CS & POAG moderate


Postoperative Diagnosis: 


same


Procedure(s) Performed: 


PIOL OD & iStent inject


Implants: 


MX60E & iStent inject


Anesthesia: MAC


Surgeon: Juan Ruby


Pathology: none sent


Condition: stable


Disposition: same day


Indications for Procedure: 


blurry vision and better glaucoma control


Operative Findings: 


no complications

## 2021-12-08 NOTE — OP
OPERATIVE REPORT



DATE OF SURGERY:

12/08/2021



PROCEDURE:

Phacoemulsification of cataract with intraocular lens implant of the right eye and

iStent implantation of the right eye.



PREOPERATIVE DIAGNOSIS:

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

stage, of the right eye.



POSTOPERATIVE DIAGNOSIS:

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

stage, of the right eye.



SURGEON:

Dr. Juan Ruby.



ANESTHESIA:

Topical.



ESTIMATED BLOOD LOSS:

None.



SPECIMEN TAKEN:

None.



NARRATIVE:

After obtaining the appropriate consent, the patient was brought to the operating room.

There he was placed under cardiac monitoring, prepped and draped in the usual sterile

manner.  He was approached from his right temporal side and at the 11 o'clock position

an MVR blade was used to create a paracentesis port. Through this opening 1% Xylocaine

MPF 50:50 mix with balanced salt solution was injected into the anterior chamber.  This

was followed by instillation of Trypan blue, which was left in place for one minute.

Balanced salt solution was used then to irrigate the Trypan blue out of the anterior

chamber and the anterior chamber was then stabilized with Viscoat. At the 9 o'clock

position a 2.5 mm keratome was used to create a self-sealing corneal flap incision.

The patient was then asked to rotate his head to his left about 45 degrees and maintain

a gaze in that particular direction.  A small amount of Viscoat was placed on the

patient's cornea and a Gonio Prism was used to examine the nasal angle of the patient's

eye.  The trabecular meshwork was easily identified with the Trypan blue, and using an

iStent inject, an attempt to place one of the devices in the nasal trabecular meshwork

was unsuccessful, and the device was freely floating in the anterior chamber.  This was

retrieved using the Utrata forceps.  Care was then taken to replace the iStent inject

instrument with a new device in an attempt to place a new implant in the trabecular

meshwork. This time the first implant was deployed correctly with an expected small

amount hemorrhage as one would expect without difficulty.  Attempt to place a second

iStent inject implant was made, and again with no capture by the trabecular meshwork,

the loose implant was then retrieved from the anterior chamber, and no further attempt

was made to implant any more injectable devices into the patient's eye, in

consideration of the significant amount of bleeding which was noted from the first

implanted device.  The patient was then rotated back to the normal supine position.  A

cystotome was used to begin a continuous tear capsulorrhexis which was then completed

using the Utrata forceps.  Hydrodissection and hydrodelineation of the lens were

accomplished with balanced salt solution.  Phacoemulsification of the lens utilizing

phaco chop was accomplished in 21.03 seconds at 16% power.  Additional Xylocaine MPF

was instilled into the anterior chamber.  This was followed by removal of the remaining

cortex under irrigation and aspiration as well as careful polishing of the posterior

capsule in the capsule vacuum mode. The capsule was then stabilized with Provisc and a

Bausch and Lomb model MX 60E 20.5 diopter posterior chamber intraocular lens was then

injected into the capsular bag without difficulty.  The remaining viscoelastic was

removed from in and around the intraocular lens as well as the anterior chamber.  The

eye was then brought to normal intraocular pressure through the paracentesis port with

balanced salt solution.  He then received 2 drops of 0.5% timolol followed by 2 drops

of moxifloxacin, was then lightly patched and shielded in the usual manner.  There were

no complications from the operation.  He tolerated the procedure well and was returned

to [facility] in good condition.





MMEDDIEL / IJN: [id number] / Job#: [id number]

## 2021-12-22 ENCOUNTER — HOSPITAL ENCOUNTER (OUTPATIENT)
Dept: HOSPITAL 47 - OR | Age: 81
Discharge: HOME | End: 2021-12-22
Payer: MEDICARE

## 2021-12-22 VITALS — HEART RATE: 80 BPM | RESPIRATION RATE: 20 BRPM | DIASTOLIC BLOOD PRESSURE: 89 MMHG | SYSTOLIC BLOOD PRESSURE: 143 MMHG

## 2021-12-22 VITALS — BODY MASS INDEX: 27.1 KG/M2

## 2021-12-22 VITALS — TEMPERATURE: 97.3 F

## 2021-12-22 DIAGNOSIS — H40.1132: ICD-10-CM

## 2021-12-22 DIAGNOSIS — H04.129: ICD-10-CM

## 2021-12-22 DIAGNOSIS — Z98.41: ICD-10-CM

## 2021-12-22 DIAGNOSIS — H25.12: Primary | ICD-10-CM

## 2021-12-22 DIAGNOSIS — Z96.1: ICD-10-CM

## 2021-12-22 DIAGNOSIS — Z98.890: ICD-10-CM

## 2021-12-22 DIAGNOSIS — H40.1123: ICD-10-CM

## 2021-12-22 DIAGNOSIS — H00.023: ICD-10-CM

## 2021-12-22 DIAGNOSIS — Z79.899: ICD-10-CM

## 2021-12-22 DIAGNOSIS — Z80.0: ICD-10-CM

## 2021-12-22 PROCEDURE — 66183 INSERT ANT DRAINAGE DEVICE: CPT

## 2021-12-22 PROCEDURE — 66984 XCAPSL CTRC RMVL W/O ECP: CPT

## 2021-12-22 RX ADMIN — CYCLOPENTOLATE HYDROCHLORIDE PRN DROPS: 10 SOLUTION/ DROPS OPHTHALMIC at 08:57

## 2021-12-22 RX ADMIN — PHENYLEPHRINE HYDROCHLORIDE PRN DROPS: 25 SOLUTION/ DROPS OPHTHALMIC at 08:54

## 2021-12-22 RX ADMIN — PHENYLEPHRINE HYDROCHLORIDE PRN DROPS: 25 SOLUTION/ DROPS OPHTHALMIC at 09:00

## 2021-12-22 RX ADMIN — PHENYLEPHRINE HYDROCHLORIDE PRN DROPS: 25 SOLUTION/ DROPS OPHTHALMIC at 08:47

## 2021-12-22 RX ADMIN — CYCLOPENTOLATE HYDROCHLORIDE PRN DROPS: 10 SOLUTION/ DROPS OPHTHALMIC at 08:44

## 2021-12-22 RX ADMIN — CYCLOPENTOLATE HYDROCHLORIDE PRN DROPS: 10 SOLUTION/ DROPS OPHTHALMIC at 08:50

## 2021-12-22 NOTE — P.OP
Date of Procedure: 12/22/21


Preoperative Diagnosis: 


NS & CS & POAG moderate


Postoperative Diagnosis: 


same


Procedure(s) Performed: 


PIOL & iStent inject


Implants: 


MX60E 20.00


Anesthesia: MAC


Surgeon: Juan Ruby


Pathology: none sent


Condition: stable


Disposition: same day


Indications for Procedure: 


blurry vision and improved glaucoma control


Operative Findings: 


no complications

## 2021-12-22 NOTE — OP
OPERATIVE REPORT



DATE OF SURGERY:

12/22/2021



PROCEDURE:

Phacoemulsification of cataract and intraocular lens implant with iStent inject

implantation of the left eye.



PREOPERATIVE DIAGNOSIS:

Nuclear sclerosis, cortical sclerosis and primary open-angle glaucoma, moderate stage.



POSTOPERATIVE DIAGNOSIS:

Nuclear sclerosis, cortical sclerosis and primary open-angle glaucoma, moderate stage.



SURGEON:

Dr. Juan Ruby.



ANESTHESIA:

Topical.



ESTIMATED BLOOD LOSS:

None.



SPECIMEN TAKEN:

None.



NARRATIVE:

After obtaining the appropriate consent, the patient was brought to the operating room.

There he was placed under cardiac monitoring, prepped and draped in the usual sterile

manner.  He was approached from his left temporal side, and at the 5 o'clock position

an MVR blade was used to create a paracentesis port. Through this opening 1% Xylocaine

MPF 50:50 mix with balanced salt solution was injected into the anterior chamber.  This

was followed by instillation of Trypan Blue, which was left in the eye for one minute.

After the one minute's time, balanced salt solution was used to irrigate the Trypan

Blue from the anterior chamber, and this was replaced with Viscoat to stabilize the

anterior chamber. At the 3 o'clock position, a 2.5 mm keratome was used to create a

self-sealing corneal flap incision.  The patient was then asked to rotate his head

approximately 45 degrees to his right, and the trabecular meshwork was viewed through a

gonioscopy lens. Using an iStent inject device model G2W, two iStent devices were

deployed on the nasal trabecular meshwork approximately 3 hours apart.  The patient was

then asked to rotate to the normal supine position and a cystotome was used to begin a

continuous tear capsulorrhexis which was completed using the Utrata forceps.

Hydrodissection and hydrodelineation of the lens was accomplished with balanced salt

solution. Phacoemulsification utilizing phaco chop was accomplished in 17.58 seconds at

21% power.  Additional Xylocaine MPF was instilled into the anterior chamber.  This was

followed by removal of the remaining cortex under irrigation and aspiration as well as

careful polishing of the posterior capsule in the capsule vacuum mode.  Provisc was

then used to stabilize the capsular bag, and a Bausch and Lomb MX60E 20 diopter

posterior chamber intraocular lens was then inserted into the capsular bag without

difficulty.  The remaining viscoelastic was removed from in and around the intraocular

lens.  The eye was brought to normal intraocular pressure through the paracentesis port

and all wounds were confirmed watertight. He then received two drops of 0.5% timolol

followed by two drops of 0.5% moxifloxacin.  He was then lightly patched and shielded

in the usual manner.  There were no complication from the procedure. He tolerated the

procedure well and was returned to [facility] in good condition.





MMODL / IJN: [id number] / Job#: [id number]

## 2023-10-16 ENCOUNTER — HOSPITAL ENCOUNTER (INPATIENT)
Dept: HOSPITAL 47 - EC | Age: 83
LOS: 3 days | Discharge: HOME HEALTH | DRG: 72 | End: 2023-10-19
Attending: STUDENT IN AN ORGANIZED HEALTH CARE EDUCATION/TRAINING PROGRAM | Admitting: STUDENT IN AN ORGANIZED HEALTH CARE EDUCATION/TRAINING PROGRAM
Payer: MEDICARE

## 2023-10-16 DIAGNOSIS — I34.0: ICD-10-CM

## 2023-10-16 DIAGNOSIS — M48.061: ICD-10-CM

## 2023-10-16 DIAGNOSIS — Z98.42: ICD-10-CM

## 2023-10-16 DIAGNOSIS — E78.5: ICD-10-CM

## 2023-10-16 DIAGNOSIS — H40.9: ICD-10-CM

## 2023-10-16 DIAGNOSIS — Z79.899: ICD-10-CM

## 2023-10-16 DIAGNOSIS — G89.29: ICD-10-CM

## 2023-10-16 DIAGNOSIS — R29.704: ICD-10-CM

## 2023-10-16 DIAGNOSIS — R29.818: ICD-10-CM

## 2023-10-16 DIAGNOSIS — N40.1: ICD-10-CM

## 2023-10-16 DIAGNOSIS — M19.90: ICD-10-CM

## 2023-10-16 DIAGNOSIS — Z86.73: ICD-10-CM

## 2023-10-16 DIAGNOSIS — M54.9: ICD-10-CM

## 2023-10-16 DIAGNOSIS — M47.896: ICD-10-CM

## 2023-10-16 DIAGNOSIS — G46.3: Primary | ICD-10-CM

## 2023-10-16 DIAGNOSIS — R33.8: ICD-10-CM

## 2023-10-16 DIAGNOSIS — I10: ICD-10-CM

## 2023-10-16 DIAGNOSIS — Z98.41: ICD-10-CM

## 2023-10-16 DIAGNOSIS — H57.02: ICD-10-CM

## 2023-10-16 DIAGNOSIS — H26.9: ICD-10-CM

## 2023-10-16 DIAGNOSIS — R29.700: ICD-10-CM

## 2023-10-16 LAB
ALBUMIN SERPL-MCNC: 3.9 G/DL (ref 3.5–5)
ALP SERPL-CCNC: 101 U/L (ref 38–126)
ALT SERPL-CCNC: 24 U/L (ref 4–49)
ANION GAP SERPL CALC-SCNC: 9 MMOL/L
APTT BLD: 23.8 SEC (ref 22–30)
AST SERPL-CCNC: 30 U/L (ref 17–59)
BASOPHILS # BLD AUTO: 0 K/UL (ref 0–0.2)
BASOPHILS NFR BLD AUTO: 0 %
BUN SERPL-SCNC: 16 MG/DL (ref 9–20)
CALCIUM SPEC-MCNC: 9.3 MG/DL (ref 8.4–10.2)
CHLORIDE SERPL-SCNC: 109 MMOL/L (ref 98–107)
CK SERPL-CCNC: 60 U/L (ref 55–170)
CO2 SERPL-SCNC: 24 MMOL/L (ref 22–30)
EOSINOPHIL # BLD AUTO: 0.6 K/UL (ref 0–0.7)
EOSINOPHIL NFR BLD AUTO: 8 %
ERYTHROCYTE [DISTWIDTH] IN BLOOD BY AUTOMATED COUNT: 5.11 M/UL (ref 4.3–5.9)
ERYTHROCYTE [DISTWIDTH] IN BLOOD: 14 % (ref 11.5–15.5)
GLUCOSE SERPL-MCNC: 99 MG/DL (ref 74–99)
HCT VFR BLD AUTO: 45.7 % (ref 39–53)
HGB BLD-MCNC: 15.8 GM/DL (ref 13–17.5)
INR PPP: 1 (ref ?–1.2)
LYMPHOCYTES # SPEC AUTO: 1.7 K/UL (ref 1–4.8)
LYMPHOCYTES NFR SPEC AUTO: 20 %
MCH RBC QN AUTO: 30.8 PG (ref 25–35)
MCHC RBC AUTO-ENTMCNC: 34.5 G/DL (ref 31–37)
MCV RBC AUTO: 89.4 FL (ref 80–100)
MONOCYTES # BLD AUTO: 0.5 K/UL (ref 0–1)
MONOCYTES NFR BLD AUTO: 7 %
NEUTROPHILS # BLD AUTO: 5.2 K/UL (ref 1.3–7.7)
NEUTROPHILS NFR BLD AUTO: 64 %
PLATELET # BLD AUTO: 205 K/UL (ref 150–450)
POTASSIUM SERPL-SCNC: 3.9 MMOL/L (ref 3.5–5.1)
PROT SERPL-MCNC: 6.9 G/DL (ref 6.3–8.2)
PT BLD: 10.8 SEC (ref 10–12.5)
SODIUM SERPL-SCNC: 142 MMOL/L (ref 137–145)
WBC # BLD AUTO: 8.2 K/UL (ref 3.8–10.6)

## 2023-10-16 PROCEDURE — 70496 CT ANGIOGRAPHY HEAD: CPT

## 2023-10-16 PROCEDURE — 70450 CT HEAD/BRAIN W/O DYE: CPT

## 2023-10-16 PROCEDURE — 80053 COMPREHEN METABOLIC PANEL: CPT

## 2023-10-16 PROCEDURE — 96361 HYDRATE IV INFUSION ADD-ON: CPT

## 2023-10-16 PROCEDURE — 93306 TTE W/DOPPLER COMPLETE: CPT

## 2023-10-16 PROCEDURE — 84443 ASSAY THYROID STIM HORMONE: CPT

## 2023-10-16 PROCEDURE — 72131 CT LUMBAR SPINE W/O DYE: CPT

## 2023-10-16 PROCEDURE — 82550 ASSAY OF CK (CPK): CPT

## 2023-10-16 PROCEDURE — 84484 ASSAY OF TROPONIN QUANT: CPT

## 2023-10-16 PROCEDURE — 36415 COLL VENOUS BLD VENIPUNCTURE: CPT

## 2023-10-16 PROCEDURE — 83735 ASSAY OF MAGNESIUM: CPT

## 2023-10-16 PROCEDURE — 70498 CT ANGIOGRAPHY NECK: CPT

## 2023-10-16 PROCEDURE — 85025 COMPLETE CBC W/AUTO DIFF WBC: CPT

## 2023-10-16 PROCEDURE — 83036 HEMOGLOBIN GLYCOSYLATED A1C: CPT

## 2023-10-16 PROCEDURE — 84439 ASSAY OF FREE THYROXINE: CPT

## 2023-10-16 PROCEDURE — 99285 EMERGENCY DEPT VISIT HI MDM: CPT

## 2023-10-16 PROCEDURE — 94760 N-INVAS EAR/PLS OXIMETRY 1: CPT

## 2023-10-16 PROCEDURE — 93005 ELECTROCARDIOGRAM TRACING: CPT

## 2023-10-16 PROCEDURE — 85610 PROTHROMBIN TIME: CPT

## 2023-10-16 PROCEDURE — 96360 HYDRATION IV INFUSION INIT: CPT

## 2023-10-16 PROCEDURE — 71046 X-RAY EXAM CHEST 2 VIEWS: CPT

## 2023-10-16 PROCEDURE — 80061 LIPID PANEL: CPT

## 2023-10-16 PROCEDURE — 70551 MRI BRAIN STEM W/O DYE: CPT

## 2023-10-16 PROCEDURE — 85730 THROMBOPLASTIN TIME PARTIAL: CPT

## 2023-10-16 RX ADMIN — CEFAZOLIN SCH MLS/HR: 330 INJECTION, POWDER, FOR SOLUTION INTRAMUSCULAR; INTRAVENOUS at 11:21

## 2023-10-16 RX ADMIN — HEPARIN SODIUM SCH: 5000 INJECTION, SOLUTION INTRAVENOUS; SUBCUTANEOUS at 20:40

## 2023-10-16 RX ADMIN — ATORVASTATIN CALCIUM SCH MG: 40 TABLET, FILM COATED ORAL at 11:21

## 2023-10-16 NOTE — CT
EXAMINATION TYPE: CT brain wo con

 

DATE OF EXAM: 10/16/2023

 

COMPARISON: None

 

INDICATION: left side weakness, unable to walk using left leg since yesterday

 

DLP: 1567.4 mGycm, Automated exposure control for dose reduction was used.

 

CONTRAST: None

 

CT of the brain is performed utilizing 3 mm thick sections through the posterior fossa and 3 mm thick
 sections through the remaining calvarium.  Study is performed within 24 hours of arrival to the hosp
ital. 

 

No abnormal hyperdensity is present to suggest an acute intracranial hemorrhage.

No mass lesion is evident.

No acute infarcts are evident. Hypodensity is present bilaterally in the periventricular white matter
 likely on the basis of chronic white matter ischemic changes. An old lacunar infarct within the left
 basal ganglion.

Ventricles and sulci are appropriate for the patient age.  

Mucosal thickening is within the bilateral maxillary sinuses and within ethmoid region and posterior 
inferior frontal sinus regions. Prior uncinectomies and ethmoidectomies are evident. Sphenoid sinuses
 and mastoid air cells appear clear.

 

IMPRESSION:

1.   No acute intracranial process. Follow-up MRI can be performed as clinically indicated.

2. Patchy periventricular white matter hypodensity, likely on the basis of chronic white matter ische
tati changes.

3. 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

## 2023-10-16 NOTE — P.HPIM
History of Present Illness


H&P Date: 10/16/23





History of Presenting Illness:





Patient is a very pleasant 83-year-old male with a reported past medical history

of BPH, hypertension, cataracts and glaucoma status post surgery, and chronic 

back pain status post lumbar spine surgeries.  Patient and patient's wife at 

bedside states patient does not take any prescribed medications as they only 

believe in natural remedies and holistic medications.  Patient reports taking 

multiple over-the-counter vitamins and remedies for blood pressure control and 

help with his urinary retention resulting from his BPH.  He denies having a ca

rdiac history or history of previous TIA or CVA.  He presented to the emergency 

department today secondary to complaints of left lower extremity weakness and 

numbness times greater than 24 hours.  Patient reports yesterday morning 

awakening with weakness/numbness to his left leg.  Patient reports he had to get

ready for Baptism so he can order it for a little while and the symptoms seemed 

to improve only to return later in the day and again go away.  Patient reports 

he went to bed last night and upon awakening this morning his left lower 

extremity weakness and numbness was persistent so he came to the emergency 

department for evaluation.  Patient denies having any other symptoms including 

headache, lightheadedness, dizziness, changes in vision or hearing, 

weakness/tingling/numbness in his face or upper extremities, difficulties with 

or changes in his speech or memory, nausea, vomiting, difficulties with her 

changes in urinary function, and denies having any numbness/tingling/weakness/sw

elling in his right lower extremity.  Patient states his left leg just feels 

heavy in the numbness/weird feeling.  Patient reports he can feel when he 

touches it but something is just different.  He underwent full evaluation in the

emergency department.  Per ED physician NIH score 4 upon arrival.  Blood glucose

was 99.  EKG revealed normal sinus rhythm at 72 bpm with inferior T-wave 

inversion in leads 2, 3, and aVF otherwise no noted T-wave or ST abnormalities 

upon personal review and interpretation.  Vital signs reviewed showing blood 

pressure 164/93, heart rate 77, respiratory rate 16, temp 97.7F, SpO2 of 98% on

room air.  CT brain was negative for acute intercranial process with radiology 

report stating patchy periventricular white matter hypodensity possibly on the 

basis of chronic white matter ischemic changes along with an old-appearing left 

basal ganglia lacunar infarct.  CTA head and neck revealed atheromatous 

calcification without flow-limiting stenosis to bilateral carotid bifurcations 

and a normal [place] of [place].  Labs completed and reviewed.  CBC, coagulation 

profile, and CMP were unremarkable.  Troponin was less than 0.012.  Chest x-ray 

was completed revealing cardiomegaly and negative for acute cardiopulmonary 

process.  Patient was admitted under our services to medical surgical unit with 

telemetry to undergo continued close neurological monitoring and workup.  

Neurology was consulted for evaluation.





Review of systems:





Pertinent positives and negatives as discussed in HPI, a complete review of 

systems was performed and all other systems are negative.





Physical exam:





Vital signs reviewed and stable. 


General: Nontoxic, no distress and appears stated age.  


Derm: Skin warm and dry, normal coloration for ethnicity.


Head: Atraumatic, normocephalic and symmetric.  


Eyes: EOMs intact, no lid lag, and anicteric sclera


Mouth: no lip lesions, mucus membranes moist


Cardiovascular: regular rate and rhythm with normal S1S2, no murmur, positive 

posterior tibial pulses bilaterally, and cap refill < 2 seconds.  


Lungs: Respirations even, regular, and unlabored on room air. Lungs CTA 

bilaterally, no rhonchi, no rales, no wheezing, and no accessory muscle usage. 


Abdominal: soft, nontender to palpation, no guarding, no appreciable 

organomegaly


Ext: ROM intact. No gross muscle atrophy, no edema, no contractures


Neuro: Speech clear, face symmetrical and CN II-XII grossly intact, movement and

sensation was intact and equal in bilateral lower extremities.  However patient 

did have some noted weakness in left lower extremity and drift when he attempted

to hold upright.  Patient did have normal heel-to-shin testing and no arm drift 

or weakness in upper extremities.  


Psych: Alert and oriented to person, place, time, and situation. Appropriate and

pleasant affect. 





Assessment and Plan of Care:





Left lower extremity weakness and numbness, rule out CVA


History of chronic lower back status post lumbar spine surgeries


Hypertension


History of cataracts and glaucoma status post surgery


-Per ED physician NIH score 4 upon arrival.  Blood glucose was 99.  


-Consult placed to neurologist


-Order placed for NIH stroke scale with neuro checks every 4 hours


-Telemetry monitoring


-Fall precautions


-Consult placed to PT/OT


-Echocardiogram to be completed


-Lipid profile and hemoglobin A1c to be drawn with a.m. labs


-Patient started on daily aspirin 81 mg and atorvastatin 40 mg


-Order placed for CT lumbar spine secondary to patient's continued left lower e

xtremity weakness








Data and imaging reviewed:


-EKG revealed normal sinus rhythm at 72 bpm with inferior T-wave inversion in 

leads 2, 3, and aVF otherwise no noted T-wave or ST abnormalities upon personal 

review and interpretation.  


-Vital signs reviewed showing blood pressure 164/93, heart rate 77, respiratory 

rate 16, temp 97.7F, SpO2 of 98% on room air.  


-CT brain was negative for acute intercranial process with radiology report 

stating patchy periventricular white matter hypodensity possibly on the basis of

chronic white matter ischemic changes along with an old-appearing left basal 

ganglia lacunar infarct.  


-CTA head and neck revealed atheromatous calcification without flow-limiting 

stenosis to bilateral carotid bifurcations and a normal [place] of [place].  


-Labs completed and reviewed.  CBC, coagulation profile, and CMP were 

unremarkable.  Troponin was less than 0.012.  


-Chest x-ray was completed revealing cardiomegaly and negative for acute 

cardiopulmonary process.  








Patient was admitted under our services to medical surgical unit with telemetry 

to undergo continued close neurological monitoring and workup. 


CODE STATUS: Full code


DVT prophylaxis: Heparin


Anticipated discharge date: Clinical course to determine


Anticipated discharge place: home


Patient was seen independently by Nurse Practitioner.


This document was prepared using Dragon dictation software.  Please allow for 

errors in transcription while rare they do occur.





Past Medical History


Past Medical History: Eye Disorder, Prostate Disorder


Additional Past Medical History / Comment(s): BPH, diverticular dx, bilateral 

eye cataracts/glaucoma.


History of Any Multi-Drug Resistant Organisms: None Reported


Past Surgical History: Back Surgery, Tonsillectomy


Additional Past Surgical History / Comment(s): Lumbar spine surgery with Coflex 

clip, colonoscopy, cataract removed right eye


Past Anesthesia/Blood Transfusion Reactions: No Reported Reaction


Past Psychological History: No Psychological Hx Reported


Smoking Status: Never smoker





- Past Family History


  ** Father


Family Medical History: COPD


Additional Family Medical History / Comment(s): Father was a smoker. He lived t 

be 83 yrs old.





  ** Mother


Family Medical History: Cancer


Additional Family Medical History / Comment(s): Colon cancer.  Mother is 98yrs 

old.





Medications and Allergies


                                Home Medications











 Medication  Instructions  Recorded  Confirmed  Type


 


No Known Home Medications  10/16/23 10/16/23 History








                                    Allergies











Allergy/AdvReac Type Severity Reaction Status Date / Time


 


No Known Allergies Allergy   Verified 10/16/23 09:32














Physical Exam


Vitals: 


                                   Vital Signs











  Temp Pulse Resp BP Pulse Ox


 


 10/16/23 09:00   82  18  179/99  98


 


 10/16/23 08:35   65  18  171/87  96


 


 10/16/23 07:08  97.7 F  77  16  164/93  98








                                Intake and Output











 10/15/23 10/16/23 10/16/23





 22:59 06:59 14:59


 


Other:   


 


  Weight   77.111 kg














Results


CBC & Chem 7: 


                                 10/16/23 07:36





                                 10/16/23 07:36


Labs: 


                  Abnormal Lab Results - Last 24 Hours (Table)











  10/16/23 Range/Units





  07:36 


 


Chloride  109 H  ()  mmol/L

## 2023-10-16 NOTE — P.CNNES
History of Present Illness


Consult date: 10/16/23


Requesting physician: Jim Saini


Reason for Consult: CVA


History of Present Illness: 





Patient is a 83-year-old right-handed male with no significant past medical 

history except some arthritis, came to the hospital today at 7 AM for 

intermittent right leg weakness.  Patient says that he woke up yesterday morning

and noticed his left leg was not functioning properly.  He took a shower, and 

would end to fall to the left.  He did not fall, but caught himself.  The 

symptoms lasted for an hour, he went to sleep, and about after waking up 2 hours

later, the symptoms are gone.  He was fine rest of the day.  He wants a football

game.  He states that he went to sleep at 11:30 PM last night.  This morning he 

woke up at 7 AM and the symptoms came back, and the left leg would not hold him 

up.  He is still weak in the left leg.  Denies any numbness or tingling in the 

lower limbs.  He claims of having only weakness in the left leg but no numbness.

 He denies any symptoms in the upper extremities whatsoever, no problem with the

facial droop, slurred speech, any visual problems.  He says that he feels that 

if he gets up, will fall.  He had recent cataract surgeries.





Vital signs on arrival but pressure 164/93, pulse rate 77 to prevent 7.9.  CBC, 

PT/PTT normal, CMP normal.  Troponin negative.  CT head revealed no acute 

intracranial process.  Patchy periventricular White better hypodensity, likely 

on the basis of chronic white matter ischemic change.  Old-appearing left basal 

ganglionic lacunar infarct.  I personally reviewed CT head, agree with the 

findings.  Chest x-ray showed no acute pulmonary process.  Cardiomegaly.  EKG 

with sinus rhythm, left axis deviation.





Patient states that he has chronic low back pain, had undergone lumbar surgery 

L3-L4 by Dr. Lobo and underwent coflex clip placement in January 2019.  He 

denies any tobacco or alcohol.  Patient has history of hypertension, but takes 

only some natural medication.  He denies diabetes.  He does have BPH, and takes 

natural medications.  He does not take any antiplatelet medication at home.





Review of Systems


Constitutional: Denies chills, Denies fever


Eyes: denies blurred vision, denies diplopia, denies pain


Ears: bilateral: decreased hearing, deny: ear discharge


Ears, nose, mouth and throat: Denies headache, Denies sore throat


Cardiovascular: Denies chest pain, Denies shortness of breath


Respiratory: Denies cough, Denies excessive sputum


Gastrointestinal: Denies abdominal pain, Denies diarrhea, Denies nausea, Denies 

vomiting


Genitourinary: Reports hematuria (Sometimes), Reports nocturia, Reports urinary 

frequency


Musculoskeletal: Reports low back pain, Reports neck pain, Denies myalgias


Integumentary: Denies pruritus, Denies rash


Neurological: Reports as per HPI


Psychiatric: Denies anxiety, Denies depression


Endocrine: Denies fatigue, Denies weight change


Hematologic/Lymphatic: Denies easy bleeding, Denies easy bruising





Past Medical History


Past Medical History: Eye Disorder, Prostate Disorder


Additional Past Medical History / Comment(s): BPH, diverticular dx, bilateral 

eye cataracts/glaucoma.


History of Any Multi-Drug Resistant Organisms: None Reported


Past Surgical History: Back Surgery, Tonsillectomy


Additional Past Surgical History / Comment(s): Lumbar spine surgery with Coflex 

clip, colonoscopy, cataract removed right eye


Past Anesthesia/Blood Transfusion Reactions: No Reported Reaction


Past Psychological History: No Psychological Hx Reported


Smoking Status: Never smoker





- Past Family History


  ** Father


Family Medical History: COPD


Additional Family Medical History / Comment(s): Father was a smoker. He lived t 

be 83 yrs old.





  ** Mother


Family Medical History: Cancer


Additional Family Medical History / Comment(s): Colon cancer.  Mother is 98yrs 

old.





Medications and Allergies


                                Home Medications











 Medication  Instructions  Recorded  Confirmed  Type


 


No Known Home Medications  10/16/23 10/16/23 History








                                    Allergies











Allergy/AdvReac Type Severity Reaction Status Date / Time


 


No Known Allergies Allergy   Verified 10/16/23 09:32














Physical Examination





- Vital Signs


Vital Signs: 


                                   Vital Signs











  Temp Pulse Resp BP Pulse Ox


 


 10/16/23 09:00   82  18  179/99  98


 


 10/16/23 08:35   65  18  171/87  96


 


 10/16/23 07:08  97.7 F  77  16  164/93  98








                                Intake and Output











 10/15/23 10/16/23 10/16/23





 22:59 06:59 14:59


 


Other:   


 


  Weight   77.111 kg














Patient is an elderly  male, very pleasant in no acute distress. 


Patient is alert awake oriented to time place and person.  Speech and language 

functions are normal.  Patient can name and repeat very well.  No aphasia or 

dysarthria.  Attention, concentration and fund of knowledge is adequate. 





On cranial nerve examination, pupils are unequal, left pupil is smaller as 

compared to the right.  Both are reactive to light.  His visual fields are full 

on confrontation, with no neglect on double simultaneous stimulation.  

Extraocular muscles are intact with no nystagmus.  Face is symmetric, tongue 

protrudes to the midline.  Palatal elevation and sensation normal, hearing is 

moderately decreased which is chronic and shoulder shrug normal, facial 

sensation normal.  





On muscle strength testing, there is no pronator drift and the strength is 

normal in arms and legs distally and proximally, except left hip flexion which 

is 5-as compared to the right.





Deep tendon reflexes are symmetric, with biceps 1+, brachioradialis 1+, knees 2,

ankles 1, plantars downgoing bilaterally. 





Sensory to touch is equal with no neglect on double simultaneous stimulation.





Cerebellar function showed no ataxia for finger-to-nose testing.  No dysdia

dochokinesia.  No ataxia for heel-to-shin testing on either side.  Tone and bulk

of muscles normal.





Gait deferred..





On general examination, there is no carotid bruit or murmur, S1-S2 audible.  

Chest is clear on consultation.  Abdomen is soft nontender.  No organomegaly, 

bowel sounds present.   Peripheral pulses are present.  No edema. left calf 

appears slightly bigger as compared to the right.





Results





- Laboratory Findings


CBC and BMP: 


                                 10/16/23 07:36





                                 10/16/23 07:36


Abnormal Lab Findings: 


                                  Abnormal Labs











  10/16/23





  07:36


 


Chloride  109 H














Assessment and Plan


Assessment: 





* Possible stroke/TIA manifesting with left leg weakness.  Symptoms occurred 

  yesterday, resolved in 1 hour.  Today's symptoms reappeared on waking up, and 

  are still present.  Rest of the examination is completely nonfocal.  Current 

  NIH stroke scale is 0.


* Anisocoria, likely related to his previous cataract surgery.


* Hypertension


* History of low back surgery


* Chronic neck and back pain.











Plan: 








* MRI of the brain without contrast, evaluate for acute CVA


* 2-D echo with bubble study to rule out PFO


* CTA head and neck showed: Atheromatous calcification without flow limiting 

  stenosis bilateral carotid bifurcation.  Normal [place] of [place].


* Patient not a candidate for TPA because of symptoms occurring on waking up, 

  and low NIH stroke scale.





* Fasting a.m. lipid panel


* Hemoglobin A1c


* Permissive hypertension for next 24-48 hours


* Close neuro checks as per protocol.


* Patient has been loaded with aspirin 324 mg in the ER.  Agree with maintaining

  aspirin 81 mg daily.  Patient was not on any antiplatelet medication at home. 

  Patient has history of BPH, sometimes gets hematuria.  Would avoid DAPT. 


* Telemetry monitoring rule out any arrhythmia


* If the stroke workups comes back negative, we will consider checking MRI of 

  the cervical and lumbar spine.  Patient does have chronic neck and back pain, 

  but denies any worsening of symptoms in the last few weeks.  No falls.


* DVT prophylaxis: Heparin 5000 units subcu every 12 hours


* PT, OT evaluate gait.


* Neurology will continue ot follow.  Thank you for the consult.

## 2023-10-16 NOTE — XR
EXAMINATION TYPE: XR chest 2V

 

DATE OF EXAM: 10/16/2023 8:14 AM

 

COMPARISON: None

 

TECHNIQUE: XR chest 2V Frontal and lateral views of the chest.

 

CLINICAL INDICATION:Male, 83 years old with history of altered mental status; 

 

FINDINGS: 

Lungs/Pleura: There is no evidence of pleural effusion, focal consolidation, or pneumothorax.  

Pulmonary vascularity: Unremarkable.

Heart/mediastinum: Cardiomediastinal silhouette is enlarged.

Musculoskeletal: Multiple level degenerative disc disease changes seen throughout the spine. Bilatera
l shoulder arthropathy.

 

 

IMPRESSION: 

1.  No acute pulmonary process.

2.  Cardiomegaly.

## 2023-10-16 NOTE — CT
EXAMINATION TYPE: CT lumbar spine wo con

 

DATE OF EXAM: 10/16/2023

 

COMPARISON: None

 

HISTORY: 83-year-old male Left sided weakness and difficulty walking.

 

TECHNIQUE: Contiguous axial scanning of the lumbar spine without IV contrast. Coronal and sagittal re
constructions performed.

 

CT DLP: 1333.6 mGycm

Automated exposure control for dose reduction was used.

 

 

FINDINGS: 

Dish throughout the visualized lower thoracic and upper lumbar spine. Hypertrophic facet arthropathy 
throughout. There is intraspinous fusion at L3-L4 with fixed grade 1 anterolisthesis L3-L4. Grade 1 r
etrolisthesis L2-L3. Remaining alignment is maintained.

 

Moderate degenerative disc disease throughout. 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
 narrowing with AP canal dimension of 1.3 cm.

 

Further narrowing secondary to disc osteophyte complexes at multiple levels. This results in moderate
 to severe focal spinal canal stenosis at L2-L3 and moderate at L1-L2. Moderate at L3-L4 and mild at 
both L4-L5 and L5-S1.

 

Anterior interbody ankylosis L4-S1 levels..

 

On the left, there is severe neuroforaminal stenosis at L3-L4 and L5-S1. Moderate at multiple additio
nal levels.

On the right, there is moderate to severe neuroforaminal stenosis at L1-L2, L2-L3, and L3-L4. Moderat
e at additional levels.

 

Small hiatal hernia. Sigmoid diverticulosis. Diverticular changes of the bladder wall. There is prono
unced soft tissue impressing onto the base of the bladder.

 

 

 

IMPRESSION: 

 

1. DISH THROUGHOUT. THERE IS POSTERIOR INTERSPINOUS FUSION AT L3-L4. THE REMAINING LEVELS SHOW VARYIN
G DEGREES OF BONY BRIDGING.

2. 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

3. 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 THERE IS OVERALL MO
DERATE TO SEVERE FOCAL SPINAL CANAL STENOSIS AT L2-L3. MODERATE AT L1-L2 AND L3-L4. MILD AT ADDITIONA
L LEVELS.

4. VARIABLE NEURAL FORAMINAL STENOSES AS OUTLINED ABOVE.

5. THERE IS SEVERE PROSTATOMEGALY. CORRELATE WITH PATIENT'S SYMPTOMS AND PSA VALUES. UROLOGY FOLLOW-U
P AS CLINICALLY INDICATED.

## 2023-10-16 NOTE — ED
General Adult HPI





- General


Chief complaint: Weakness


Stated complaint: Can't walk


Time Seen by Provider: 10/16/23 07:07


Source: patient


Mode of arrival: wheelchair


Limitations: no limitations





- History of Present Illness


Initial comments: 


Dictation was produced using dragon dictation software. please excuse any 

grammatical, word or spelling errors. 











Chief Complaint: 83-year-old male witha past medical history presents to the ER 

with 2 days of left lower extremity weakness





History of Present Illness: 83-year-old male presents to the emergency 

Department with approximately 2 days of left lower extremity weakness.  States 

that yesterday he woke up and went to the bathroom.  He walked out of the 

bathroom after taking his usual activity and all of a sudden felt like he 

couldn't walk with his left leg.  States that he can barely ambulate due to 

significant weakness to his left lower extremity.  He also feels like he can't 

control it.  Denies any numbness or paresthesias.  No history of stroke.  Denies

any headache.  No pain complaints








The ROS documented in this emergency department record has been reviewed and 

confirmed by me.  Those systems with pertinent positive or negative responses 

have been documented in the HPI.  All other systems are other negative and/or 

noncontributory.

















- Related Data


                                Home Medications











 Medication  Instructions  Recorded  Confirmed


 


No Known Home Medications  10/16/23 10/16/23











                                    Allergies











Allergy/AdvReac Type Severity Reaction Status Date / Time


 


No Known Allergies Allergy   Verified 10/16/23 09:32














Review of Systems


ROS Statement: 


Those systems with pertinent positive or pertinent negative responses have been 

documented in the HPI.





ROS Other: All systems not noted in ROS Statement are negative.





Past Medical History


Past Medical History: Eye Disorder, Prostate Disorder


Additional Past Medical History / Comment(s): BPH, diverticular dx, bilateral 

eye cataracts/glaucoma.


History of Any Multi-Drug Resistant Organisms: None Reported


Past Surgical History: Back Surgery, Tonsillectomy


Additional Past Surgical History / Comment(s): Lumbar spine surgery with Coflex 

clip, colonoscopy, cataract removed right eye


Past Anesthesia/Blood Transfusion Reactions: No Reported Reaction


Past Psychological History: No Psychological Hx Reported


Smoking Status: Never smoker





- Past Family History


  ** Father


Family Medical History: COPD


Additional Family Medical History / Comment(s): Father was a smoker. He lived t 

be 83 yrs old.





  ** Mother


Family Medical History: Cancer


Additional Family Medical History / Comment(s): Colon cancer.  Mother is 98yrs 

old.





General Exam





- General Exam Comments


Initial Comments: 








PHYSICAL EXAM:


General Impression: Alert and oriented x3, not in acute distress


HEENT: Normocephalic atraumatic, extra-ocular movements intact, pupils equal and

reactive to light bilaterally, mucous membranes moist.


Cardiovascular: Heart regular rate and rhythm


Chest: Able to complete full sentences, no retractions, no tachypnea


Abdomen: abdomen soft, non-tender, non-distended, no organomegaly


Musculoskeletal: Pulses present and equal in all extremities, no peripheral 

edema


Motor:  no focal deficits noted


Neurological: CN II-XII grossly intact, drift to the left lower extremity, 

unable to ambulate due to significant weakness in the left lower extremity.,  No

hyperreflexia or clonus to the left lower extremity


Skin: Intact with no visualized rashes


Psych: Normal affect and mood











Limitations: no limitations





Course


                                   Vital Signs











  10/16/23 10/16/23





  07:08 08:35


 


Temperature 97.7 F 


 


Pulse Rate 77 65


 


Respiratory 16 18





Rate  


 


Blood Pressure 164/93 171/87


 


O2 Sat by Pulse 98 96





Oximetry  














EKG Findings





- EKG Comments:


EKG Findings:: My EKG interpretation: Ventricular rate 72, sinus rhythm,.  

Interval 177, , . No SD prolongation, no QTC prolongation, no ST 

or T-wave changes noted.  .  Overall, this EKG is unremarkable





Medical Decision Making





- Medical Decision Making


Was pt. sent in by a medical professional or institution (, PA, NP, urgent 

care, hospital, or nursing home...) When possible be specific


@  -No


Did you speak to anyone other than the patient for history (EMS, parent, family,

police, friend...)? What history was obtained from this source 


@  -No


Did you review nursing and triage notes (agree or disagree)?  Why? 


@  -I reviewed and agree with nursing and triage notes


Were old charts reviewed (outside hosp., previous admission, EMS record, old 

EKG, old radiological studies, urgent care reports/EKG's, nursing home records)?

Report findings 


@  -No old charts were reviewed


Differential Diagnosis (chest pain, altered mental status, abdominal pain women,

abdominal pain men, vaginal bleeding, musculoskeletal, weakness, fever, dyspnea,

syncope, headache, dizziness, GI bleed, back pain, seizure, CVA, palpatations, 

mental health)? 


@  -








Differential CVA:


Ischemic stroke, hemorrhagic stroke, brain tumor, atypical migraine, Wernicke's 

encephalopathy, seizure, multiple sclerosis, meningitis, encephalitis, 

hypoglycemia, Guillain-Barr, electrolytes disturbance, myasthenia gravis.... 

This is not meant to be an all-inclusive list


EKG interpreted by me (3pts min.).


@  -See above


X-rays interpreted by me (1pt min.).


@  -Two-view chest x-ray is unremarkable


CT interpreted by me (1pt min.).


@  -Computed tomography scan of brain and CT angiography of the head and neck 

shows no acute processes


U/S interpreted by me (1pt. min.).


@  -None done


What testing was considered but not performed or refused? (CT, X-rays, U/S, 

labs)? Why?


@  -None


What meds were considered but not given or refused? Why?


@  -None


Did you discuss the management of the patient with other professionals 

(professionals i.e. , PA, NP, lab, RT, psych nurse, , , 

teacher, , )? Give summary


@  -discussed with nurse practitioner Tae for hospitalist admission


Was smoking cessation discussed for >3mins.?


@  -No


Was critical care preformed (if so, how long)?


@  -No


Were there social determinants of health that impacted care today? How? 

(Homelessness, low income, unemployed, alcoholism, drug addiction, tr

ansportation, low edu. Level, literacy, decrease access to med. care, senior care, 

rehab)?


@  -No


Was there de-escalation of care discussed even if they declined (Discuss DNR or 

withdrawal of care, Hospice)? DNR status


@  -No


What co-morbidities impacted this encounter? (DM, HTN, Smoking, COPD, CAD, 

Cancer, CVA, ARF, Chemo, Hep., AIDS, mental health diagnosis, sleep apnea, 

morbid obesity)?


@  -None


Was patient admitted / discharged? Hospital course, mention meds given and 

route, prescriptions, significant lab abnormalities, going to OR and other 

pertinent info.


@  - 83 Year-old male presents with almost 48 hours of left lower extremity 

weakness.  His clinical presentation worrisome for CVA.  Vital signs stable.  

Patient is a score of 4-5.  He is outside the window for alteplase no large 

vessel occlusion on CT angiography.  No hemorrhage intracranially.  Patient giv

en aspirin will be admitted with consultation to neurology.


Undiagnosed new problem with uncertain prognosis?


@  -No


Drug Therapy requiring intensive monitoring for toxicity (Heparin, Nitro, 

Insulin, Cardizem)?


@  -No


Were any procedures done?


@  -No


Diagnosis/symptom?  Acute, or Chronic, or Acute on Chronic?  Uncomplicated 

(without systemic symptoms) or Complicated (systemic symptoms)?


@  -CVA


Side effects of treatment?


@  -No


Exacerbation, Progression, or Severe Exacerbation?


@  -No


Poses a threat to life or bodily function? How? (Chest pain, USA, MI, pneumonia,

PE, COPD, DKA, ARF, appy, cholecystitis, CVA, Diverticulitis, Homicidal, 

Suicidal, threat to staff... and all critical care pts)


@  -yes








- Lab Data


Result diagrams: 


                                 10/16/23 07:36





                                 10/16/23 07:36


                                   Lab Results











  10/16/23 10/16/23 10/16/23 Range/Units





  07:36 07:36 07:36 


 


WBC  8.2    (3.8-10.6)  k/uL


 


RBC  5.11    (4.30-5.90)  m/uL


 


Hgb  15.8    (13.0-17.5)  gm/dL


 


Hct  45.7    (39.0-53.0)  %


 


MCV  89.4    (80.0-100.0)  fL


 


MCH  30.8    (25.0-35.0)  pg


 


MCHC  34.5    (31.0-37.0)  g/dL


 


RDW  14.0    (11.5-15.5)  %


 


Plt Count  205    (150-450)  k/uL


 


MPV  9.8    


 


Neutrophils %  64    %


 


Lymphocytes %  20    %


 


Monocytes %  7    %


 


Eosinophils %  8    %


 


Basophils %  0    %


 


Neutrophils #  5.2    (1.3-7.7)  k/uL


 


Lymphocytes #  1.7    (1.0-4.8)  k/uL


 


Monocytes #  0.5    (0-1.0)  k/uL


 


Eosinophils #  0.6    (0-0.7)  k/uL


 


Basophils #  0.0    (0-0.2)  k/uL


 


PT   10.8   (10.0-12.5)  sec


 


INR   1.0   (<1.2)  


 


APTT   23.8   (22.0-30.0)  sec


 


Sodium    142  (137-145)  mmol/L


 


Potassium    3.9  (3.5-5.1)  mmol/L


 


Chloride    109 H  ()  mmol/L


 


Carbon Dioxide    24  (22-30)  mmol/L


 


Anion Gap    9  mmol/L


 


BUN    16  (9-20)  mg/dL


 


Creatinine    0.76  (0.66-1.25)  mg/dL


 


Est GFR (CKD-EPI)AfAm    >90  (>60 ml/min/1.73 sqM)  


 


Est GFR (CKD-EPI)NonAf    83  (>60 ml/min/1.73 sqM)  


 


Glucose    99  (74-99)  mg/dL


 


Calcium    9.3  (8.4-10.2)  mg/dL


 


Total Bilirubin    1.0  (0.2-1.3)  mg/dL


 


AST    30  (17-59)  U/L


 


ALT    24  (4-49)  U/L


 


Alkaline Phosphatase    101  ()  U/L


 


Creatine Kinase    60  ()  U/L


 


Troponin I     (0.000-0.034)  ng/mL


 


Total Protein    6.9  (6.3-8.2)  g/dL


 


Albumin    3.9  (3.5-5.0)  g/dL














  10/16/23 Range/Units





  07:36 


 


WBC   (3.8-10.6)  k/uL


 


RBC   (4.30-5.90)  m/uL


 


Hgb   (13.0-17.5)  gm/dL


 


Hct   (39.0-53.0)  %


 


MCV   (80.0-100.0)  fL


 


MCH   (25.0-35.0)  pg


 


MCHC   (31.0-37.0)  g/dL


 


RDW   (11.5-15.5)  %


 


Plt Count   (150-450)  k/uL


 


MPV   


 


Neutrophils %   %


 


Lymphocytes %   %


 


Monocytes %   %


 


Eosinophils %   %


 


Basophils %   %


 


Neutrophils #   (1.3-7.7)  k/uL


 


Lymphocytes #   (1.0-4.8)  k/uL


 


Monocytes #   (0-1.0)  k/uL


 


Eosinophils #   (0-0.7)  k/uL


 


Basophils #   (0-0.2)  k/uL


 


PT   (10.0-12.5)  sec


 


INR   (<1.2)  


 


APTT   (22.0-30.0)  sec


 


Sodium   (137-145)  mmol/L


 


Potassium   (3.5-5.1)  mmol/L


 


Chloride   ()  mmol/L


 


Carbon Dioxide   (22-30)  mmol/L


 


Anion Gap   mmol/L


 


BUN   (9-20)  mg/dL


 


Creatinine   (0.66-1.25)  mg/dL


 


Est GFR (CKD-EPI)AfAm   (>60 ml/min/1.73 sqM)  


 


Est GFR (CKD-EPI)NonAf   (>60 ml/min/1.73 sqM)  


 


Glucose   (74-99)  mg/dL


 


Calcium   (8.4-10.2)  mg/dL


 


Total Bilirubin   (0.2-1.3)  mg/dL


 


AST   (17-59)  U/L


 


ALT   (4-49)  U/L


 


Alkaline Phosphatase   ()  U/L


 


Creatine Kinase   ()  U/L


 


Troponin I  <0.012  (0.000-0.034)  ng/mL


 


Total Protein   (6.3-8.2)  g/dL


 


Albumin   (3.5-5.0)  g/dL














Disposition


Clinical Impression: 


 CVA (cerebral vascular accident)





Disposition: ADMITTED AS IP TO THIS HOSP


Condition: Fair


Referrals: 


Gaston Aguilar MD [Primary Care Provider] - 1-2 days


Decision Time: 10:21

## 2023-10-16 NOTE — CT
EXAMINATION TYPE: CT angio head neck

 

DATE OF EXAM: 10/16/2023

 

HISTORY: left side weakness, unable to walk using left leg since yesterday

 

COMPARISON: None

 

CT DLP: 1567.4 mGycm.  Automated Exposure Control for Dose Reduction was Utilized.

 

TECHNIQUE:  CTA scan of the neck is performed with IV Contrast, patient injected with 65 mL of Isovue
 370, axial images are obtained, coronal and sagittal reformatted images are reviewed. Three-D recons
tructed images are created on an independent workstation and reviewed.  Source images are reviewed.

 

FINDINGS:

 

Carotid/Vascular Structures: There is a 3 vessel arch.

Common carotid arteries bifurcate into internal and external carotid arteries without significant marito
w limiting stenosis. Atheromatous plaquing is present at the carotid bifurcations. No significant marito
w-limiting stenosis identified.

Vertebral arteries are codominant.

Internal carotid arteries and vertebral arteries are patent to the skull base.

 

Cervical of [place]: Vertebral basilar system appears normal. Posterior cerebral vasculature is unrema
rkable. Internal carotid arteries bifurcate normally into A1 and M1 segments. A2 segments are normal.


The anterior communicating artery is patent.

The right posterior communicating artery is patent.

The left posterior communicating artery is patent.

 

IMPRESSION:

1. Atheromatous calcification without flow-limiting stenosis bilateral carotid bifurcations.

2. Normal [place]

 

NASCET criteria was used in interpretation of this exam?

## 2023-10-17 LAB
ALBUMIN SERPL-MCNC: 4 G/DL (ref 3.5–5)
ALP SERPL-CCNC: 106 U/L (ref 38–126)
ALT SERPL-CCNC: 26 U/L (ref 4–49)
ANION GAP SERPL CALC-SCNC: 8 MMOL/L
AST SERPL-CCNC: 30 U/L (ref 17–59)
BASOPHILS # BLD AUTO: 0 K/UL (ref 0–0.2)
BASOPHILS NFR BLD AUTO: 0 %
BUN SERPL-SCNC: 14 MG/DL (ref 9–20)
CALCIUM SPEC-MCNC: 9.3 MG/DL (ref 8.4–10.2)
CHLORIDE SERPL-SCNC: 109 MMOL/L (ref 98–107)
CHOLEST SERPL-MCNC: 197 MG/DL (ref 0–200)
CO2 SERPL-SCNC: 26 MMOL/L (ref 22–30)
EOSINOPHIL # BLD AUTO: 0.8 K/UL (ref 0–0.7)
EOSINOPHIL NFR BLD AUTO: 10 %
ERYTHROCYTE [DISTWIDTH] IN BLOOD BY AUTOMATED COUNT: 4.97 M/UL (ref 4.3–5.9)
ERYTHROCYTE [DISTWIDTH] IN BLOOD: 13.9 % (ref 11.5–15.5)
GLUCOSE SERPL-MCNC: 89 MG/DL (ref 74–99)
HCT VFR BLD AUTO: 46 % (ref 39–53)
HDLC SERPL-MCNC: 61 MG/DL (ref 40–60)
HGB BLD-MCNC: 15.2 GM/DL (ref 13–17.5)
LDLC SERPL CALC-MCNC: 113.6 MG/DL (ref 0–131)
LYMPHOCYTES # SPEC AUTO: 2 K/UL (ref 1–4.8)
LYMPHOCYTES NFR SPEC AUTO: 26 %
MAGNESIUM SPEC-SCNC: 2 MG/DL (ref 1.6–2.3)
MCH RBC QN AUTO: 30.6 PG (ref 25–35)
MCHC RBC AUTO-ENTMCNC: 33.1 G/DL (ref 31–37)
MCV RBC AUTO: 92.5 FL (ref 80–100)
MONOCYTES # BLD AUTO: 0.4 K/UL (ref 0–1)
MONOCYTES NFR BLD AUTO: 6 %
NEUTROPHILS # BLD AUTO: 4.5 K/UL (ref 1.3–7.7)
NEUTROPHILS NFR BLD AUTO: 57 %
PLATELET # BLD AUTO: 188 K/UL (ref 150–450)
POTASSIUM SERPL-SCNC: 4 MMOL/L (ref 3.5–5.1)
PROT SERPL-MCNC: 7 G/DL (ref 6.3–8.2)
SODIUM SERPL-SCNC: 143 MMOL/L (ref 137–145)
T4 FREE SERPL-MCNC: 1.03 NG/DL (ref 0.78–2.19)
TRIGL SERPL-MCNC: 112 MG/DL (ref 0–149)
VLDLC SERPL CALC-MCNC: 22.4 MG/DL (ref 5–40)
WBC # BLD AUTO: 7.9 K/UL (ref 3.8–10.6)

## 2023-10-17 RX ADMIN — HEPARIN SODIUM SCH: 5000 INJECTION, SOLUTION INTRAVENOUS; SUBCUTANEOUS at 21:36

## 2023-10-17 RX ADMIN — ATORVASTATIN CALCIUM SCH: 40 TABLET, FILM COATED ORAL at 09:49

## 2023-10-17 RX ADMIN — ASPIRIN 81 MG CHEWABLE TABLET SCH MG: 81 TABLET CHEWABLE at 09:49

## 2023-10-17 RX ADMIN — HEPARIN SODIUM SCH: 5000 INJECTION, SOLUTION INTRAVENOUS; SUBCUTANEOUS at 09:49

## 2023-10-17 RX ADMIN — CEFAZOLIN SCH: 330 INJECTION, POWDER, FOR SOLUTION INTRAMUSCULAR; INTRAVENOUS at 12:20

## 2023-10-17 NOTE — P.PN
Subjective


Progress Note Date: 10/17/23





Hospital Course: 


Patient is a very pleasant 83-year-old male with a reported past medical history

of BPH, hypertension, cataracts and glaucoma status post surgery, and chronic 

back pain status post lumbar spine surgeries presenting with new left lower 

extremity weakness.  Per ED physician NIH score 4 upon arrival.  Blood glucose 

was 99.  EKG revealed normal sinus rhythm at 72 bpm with inferior T-wave 

inversion in leads 2, 3, and aVF otherwise no noted T-wave or ST abnormalities 

upon personal review and interpretation.  Vital signs reviewed showing blood 

pressure 164/93, heart rate 77, respiratory rate 16, temp 97.7F, SpO2 of 98% on

room air.  CT brain was negative for acute intercranial process with radiology 

report stating patchy periventricular white matter hypodensity possibly on the 

basis of chronic white matter ischemic changes along with an old-appearing left 

basal ganglia lacunar infarct.  CTA head and neck revealed atheromatous 

calcification without flow-limiting stenosis to bilateral carotid bifurcations 

and a normal [place].  Labs completed and reviewed.  CBC, coagulation 

profile, and CMP were unremarkable.  Troponin was less than 0.012.  Chest x-ray 

was completed revealing cardiomegaly and negative for acute cardiopulmonary 

process.  Patient was admitted under our services to medical surgical unit with 

telemetry to undergo continued close neurological monitoring and workup.  

Neurology was consulted for evaluation.  Lumbar CT showedthroughout, moderate to

severe focal spinal canal stenosis at L2 to L3, severe prostatomegaly.  

Echocardiogram shows moderate mitral regurgitation, LVH with preserved systolic 

function, right ventricular enlargement.











Subjective: 


Patient seen and examined at bedside.  No acute events overnight.  Hesitant ab

out taking any medications.  Continues to have left lower extremity weakness and

numbness.





Pertinent positives and negatives as discussed above, a complete review of 

systems was performed and all other systems are negative.








Vitals Signs Reviewed. 





General: nontoxic, no distress, appears at stated age


Derm: warm, dry


Head: atraumatic, normocephalic, symmetric


Eyes: EOMI, no lid lag, anicteric sclera


Mouth: no lip lesion, mucus membranes moist


Cardiovascular: S1S2 reg, no murmur


Lungs: CTA bilateral, no rhonchi, no rales , no accessory muscle use


Abdominal: soft,  nontender to palpation, no guarding, no appreciable 

organomegaly


Ext: no gross muscle atrophy, no edema, no contractures, reduced strength in 

left lower extremity


Neuro:  CN II-XI grossly intact, no focal neuro deficits


Psych: Alert, oriented, appropriate affect 





Data Reviewed Today: 


Pertinent Labs: WBC 7.9, hemoglobin 15.2, creatinine 0.78, TSH 0.11, free T4 

1.03, lipid panel pending


Imaging: Echocardiogram report reviewed, has moderate mitral regurgitation, LVH 

with preserved systolic function, right ventricular enlargement





Assessment and Plan:


Left lower extremity weakness and numbness, suspected ischemic CVA


History of chronic lower back status post lumbar spine surgeries


Lumbar spinal osteoarthritis


Hypertension, uncontrolled


History of cataracts and glaucoma status post surgery


BPH





-Neurology note reviewed, MRI of brain pending, concerning for acute stroke 

involving left lateral medulla


-Started on lisinopril and hydrochlorothiazide


-Continue aspirin and statin


-Patient is not keen on taking any medications, wants to continue his 

naturopathic medications


-Has been dealing with BPH for a while, but does not want to take any new 

medications





DVT ppx: Subcu heparin





Code status: Full code





Anticipated discharge place: Pending clinical course


Anticipated discharge time: Pending clinical course





Objective





- Vital Signs


Vital signs: 


                                   Vital Signs











Temp  97.9 F   10/17/23 04:30


 


Pulse  79   10/17/23 12:11


 


Resp  16   10/17/23 12:11


 


BP  158/88   10/17/23 12:11


 


Pulse Ox  98   10/17/23 12:11


 


FiO2  21   10/17/23 08:11








                                 Intake & Output











 10/16/23 10/17/23 10/17/23





 18:59 06:59 18:59


 


Intake Total   420


 


Output Total  350 


 


Balance  -350 420


 


Weight 77.111 kg 77.111 kg 


 


Intake:   


 


  Oral   420


 


Output:   


 


  Urine  350 


 


Other:   


 


  Voiding Method  Urinal 


 


  # Voids  1 3














- Labs


CBC & Chem 7: 


                                 10/17/23 07:28





                                 10/17/23 07:28


Labs: 


                  Abnormal Lab Results - Last 24 Hours (Table)











  10/17/23 10/17/23 Range/Units





  07:28 07:28 


 


Eosinophils #  0.8 H   (0-0.7)  k/uL


 


Chloride   109 H  ()  mmol/L


 


TSH   0.110 L  (0.465-4.680)  mIU/L

## 2023-10-17 NOTE — P.PN
Subjective


Progress Note Date: 10/17/23





Patient was seen for a follow-up.  Patient says that his left leg still feels 

weak, trouble standing.  He is noticing pain behind his left eye, then goes to 

the 4 head.  He denies any nausea vomiting any hoarseness or slurred speech.  

Patient states that when he turns his head fast, he feels dizzy, but that is not

new, has history of vertigo for long time.  Patient's wife was also present, who

informed me that patient fell off the roof in the garage about 10 feet onto the 

cement about 15 years ago.  Fortunately he did not suffer much injuries.  He 

denies any new numbness tingling focal weakness otherwise.





Objective





- Vital Signs


Vital signs: 


                                   Vital Signs











Temp  97.9 F   10/17/23 04:30


 


Pulse  105 H  10/17/23 08:00


 


Resp  16   10/17/23 08:00


 


BP  138/84   10/17/23 08:00


 


Pulse Ox  95   10/17/23 08:11


 


FiO2  21   10/17/23 08:11








                                 Intake & Output











 10/16/23 10/17/23 10/17/23





 18:59 06:59 18:59


 


Intake Total   180


 


Output Total  350 


 


Balance  -350 180


 


Weight 77.111 kg 77.111 kg 


 


Intake:   


 


  Oral   180


 


Output:   


 


  Urine  350 


 


Other:   


 


  Voiding Method  Urinal 


 


  # Voids  1 3














- Exam





On examination patient is alert and awake.  Speech and language functions are 

normal.  Mentation normal.





On cranial examination, patient has anisocoria, with left pupil smaller than the

right.  No obvious ptosis.  Extraocular muscles are intact with no nystagmus, no

diplopia.  Face is symmetric and tongue protrudes to the midline.  Tongue is 

midline, palatal elevation is equal bilaterally.  Shoulder shrug normal.





On muscle strength testing there is no pronator drift and the strength is normal

in arms and legs.





No ataxia for finger-to-nose or heel-to-shin testing.





Sensory to touch is equal bilaterally.  The cold sensation he was feeling less 

in the right arm and right leg as compared to the left side.  Cold sensation is 

equal on the face.





- Labs


CBC & Chem 7: 


                                 10/17/23 07:28





                                 10/17/23 07:28


Labs: 


                  Abnormal Lab Results - Last 24 Hours (Table)











  10/17/23 10/17/23 Range/Units





  07:28 07:28 


 


Eosinophils #  0.8 H   (0-0.7)  k/uL


 


Chloride   109 H  ()  mmol/L


 


TSH   0.110 L  (0.465-4.680)  mIU/L














Assessment and Plan


Assessment: 





* Possible stroke manifesting with left leg weakness.  Symptoms occurred 

  transiently one day prior to arrival, and it resolved in 1 hour.  On the day 

  of admission, her symptoms reappeared on waking up, and are still present.  

  Rest of the examination is completely nonfocal.  Current NIH stroke scale is 

  0.


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  Likely from small 

  vessel disease.


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


* Hypertension


* History of low back surgery


* Chronic neck and back pain.











Plan: 








* MRI of the brain without contrast revealed possibility of an acute stroke 

  involving the left lateral medulla.  Patient has some features of [place] 

  syndrome, but not complete.  Await official radiology report.


* 2-D echo revealed mildly increased left ventricular wall thickness.  Left 

  ventricular cavity size is normal.  EF is 55-60%.   Severe right ventricular 

  dilation.  Mildly dilated left atrium and right atrium.  Mild pulmonary 

  hypertension.   Moderate MR.  Diffuse thickening/sclerosis of the aortic valve

  cusps without reduced excursion.  Mild AR.  Mild to moderate TR.


* CTA head and neck showed: Atheromatous calcification without flow limiting 

  stenosis bilateral carotid bifurcation.  Normal [place].  I 

  personally reviewed CTA, agree with the findings.  No obvious dissection.





* Fasting a.m. lipid panel cholesterol 197, , HDL 61 and triglycerides 

  112.  Agree with starting Lipitor 40 mg daily.


* Hemoglobin A1c 5.7


* Optimize control of blood pressure.


* Close neuro checks.


* Patient has been loaded with aspirin 324 mg in the ER.  Agree with maintaining

  aspirin 81 mg daily.  Patient was not on any antiplatelet medication at home. 

  Patient has history of BPH, sometimes gets hematuria.  Would avoid DAPT.  

  Patient declined adding Plavix.  He does not want to take medications, as he 

  always prefers naturopathic medications.


* Telemetry monitoring so far showing sinus rhythm, sinus bradycardia sometimes 

  going to the 50s, some PVC and PACs.  No other arrhythmia.





* CT of the lumbar spine showed DISH throughout.  There is posterior 

  interspinous fusion at L3-L4 .  The remaining levels showed varying degrees of

  bony bridging.  Fixed degenerative grade 1 anterolisthesis L3-L4 and grade 1 

  retrolisthesis L2-L3.  Disc osteophyte complexes superimposed on congenital 

  spinal canal narrowing.  There is overall moderate to severe focal spinal 

  canal stenosis at L2-L3.  Moderate at L1-L2 and L3-L4 mild at additional 

  levels.  There is severe prostatomegaly.  Correlate with patient's symptoms 

  and PSA values.  IM to address prostate.


* Patient does have moderate to severe focal spinal canal stenosis at L2-L3.  

  However he wants to hold off on orthopedic spine evaluation, until CVA has 

  been ruled out/evaluated and treated.  May need MRI of the lumbar spine.


* Patient does have chronic neck and back pain, but denies any worsening of 

  symptoms in the last few weeks.  No falls.





* DVT prophylaxis: Heparin 5000 units subcu every 12 hours


* TSH is low 0.110, with normal free T4 of 1.03.  IM to address abnormal TFTs.


* PT, OT evaluate gait.


 





Addendum 2:00 p.m.:


MRI of the brain reported tiny 2 mm area of indeterminate signal on the 

diffusion imaging with corresponding FLAIR abnormality along the left lateral 

margin of the medulla.  Suspect this is most likely subacute to chronic.  Corre

late clinically for confirmation.  I suspect this is acute to subacute ischemic 

stroke involving left lateral Medulla.  Patient has some clinical symptoms with 

it.

## 2023-10-17 NOTE — MR
EXAMINATION TYPE: MR brain wo con

 

DATE OF EXAM: 10/17/2023

 

COMPARISON: NONE

 

HISTORY: Left leg weakness

 

TECHNIQUE: T1-weighted sagittal, T2, FLAIR, and diffusion axial, and T2 coronal coronal views of the 
brain are submitted.  

 

FINDINGS:

There is no evidence of acute ischemia.  Intermediate signal on diffusion imaging within the left lat
eral margin of the patella with corresponding FLAIR abnormality most likely is chronic. Correlate cli
nically.

 

Changes of chronic sinusitis. Orbits are symmetric. There is moderate generalized degenerative change
 and diffuse focal areas of abnormal signal in the white matter which are nonspecific but most typica
l of remote microvascular ischemia.

 

Craniocervical junction maintained. Hypertrophic and degenerative changes spine. There is a partially
 empty sella turcica.

 

 

IMPRESSION:

1. No sizable area of acute ischemia or mass effect. There is a tiny 2 mm area of intermediate signal
 on diffusion imaging with corresponding FLAIR abnormality along the left lateral margin of the medul
la. Suspect this is most likely subacute to chronic. Correlate clinically for confirmation.

2. Moderate degenerative diffuse white matter changes most typical remote microvascular ischemia.

## 2023-10-17 NOTE — CA
Transthoracic Echo Report 

 Name: Mason Aldridge 

 MRN:    [id number] 

 Age:    83     Gender:     M 

 

 :    1940 

 Exam Date:     10/17/2023 08:28 

 Exam Location: [facility] 

 Ht (in):     68     Wt (lb):     170 

 Ordering Physician:        Tae Montaño 

 Attending/Referring Phys: 

 Technician         Funmilayo Silva [facility] 

 Procedure CPT: 

 Indications:       CVA 

 

 Cardiac Hx: 

 Technical Quality:      Fair 

 Contrast 1:                                Total Dose (mL): 

 Contrast 2:                                Total Dose (mL): 

 

 MEASUREMENTS  (Male / Female) Normal Values 

 2D ECHO 

 LV Diastolic Diameter PLAX        5.2 cm                4.2 - 5.9 / 3.9 - 5.3 cm 

 LV Systolic Diameter PLAX         3.2 cm                 

 IVS Diastolic Thickness           1.1 cm                0.6 - 1.0 / 0.6 - 0.9 cm 

 LVPW Diastolic Thickness          1.1 cm                0.6 - 1.0 / 0.6 - 0.9 cm 

 LV Relative Wall Thickness        0.4                    

 LVOT Diameter                     2.0 cm                 

 Ascending Aorta Diameter          3.4 cm                 

 

 M-MODE 

 Aortic Root Diameter MM           2.6 cm                 

 LA Systolic Diameter MM           3.9 cm                 

 LA Ao Ratio MM                    1.5                    

 AV Cusp Separation MM             2.1 cm                 

 

 DOPPLER 

 AV Peak Velocity                  164.2 cm/s             

 AV Peak Gradient                  10.8 mmHg              

 AV Mean Velocity                  111.1 cm/s             

 AV Mean Gradient                  5.7 mmHg               

 AV Velocity Time Integral         31.7 cm                

 AI Peak Velocity                  383.6 cm/s             

 AI Peak Gradient                  58.8 mmHg              

 AI Pressure Half Time             491.7 ms               

 LVOT Peak Velocity                92.3 cm/s              

 LVOT Peak Gradient                3.4 mmHg               

 LVOT Velocity Time Integral       20.5 cm                

 LVOT Stroke Volume                62.1 cm???               

 LVOT Stroke Volume Index          32.5 ml/m???             

 LVOT Cardiac Index                2373.0 cm???/min???m???      

 AV Area Cont Eq vti               2.0 cm???                

 AV Area Cont Eq pk                1.7 cm???                

 Mitral E Point Velocity           60.0 cm/s              

 Mitral A Point Velocity           98.8 cm/s              

 Mitral E to A Ratio               0.6                    

 MV Deceleration Time              225.3 ms               

 LV E' Lateral Velocity            6.8 cm/s               

 Mitral E to LV E' Lateral Ratio   8.8                    

 LV E' Septal Velocity             5.8 cm/s               

 Mitral E to LV E' Septal Ratio    10.4                   

 TR Peak Velocity                  278.0 cm/s             

 TR Peak Gradient                  30.9 mmHg              

 Right Atrial Pressure             3.0 mmHg               

 Pulmonary Artery Systolic Pressu  33.9 mmHg              

 Right Ventricular Systolic Press  33.9 mmHg              

 

 

 FINDINGS 

 Left Ventricle 

 Mildly increased left ventricular wall thickness. Left ventricular cavity size  

 normal. Normal left ventricular systolic function with no obvious regional wall  

 motion abnormalities. Left ventricular ejection fraction is estimated at 55- 

 60%. 

 

 Right Ventricle 

 Severe right ventricular dilatation. Mild pulmonary hypertension. 

 

 Right Atrium 

 Mild right atrial dilatation. 

 

 Left Atrium 

 Mild left atrial dilatation. 

 

 Mitral Valve 

 Mitral valve thickened. Moderate mitral regurgitation. 

 

 Aortic Valve 

 Trileaflet aortic valve. Diffuse thickening (sclerosis) of the aortic valve  

 cusps without reduced excursion. Mild aortic regurgitation. 

 

 Tricuspid Valve 

 Structurally normal tricuspid valve. Mild-to-moderate tricuspid regurgitation. 

 

 Pulmonic Valve 

 Pulmonic valve not well visualized. 

 

 Pericardium 

 No pericardial effusion. 

 

 Aorta 

 Normal size aortic root and proximal ascending aorta. 

 

 CONCLUSIONS 

 LVH with preserved systolic function 

 Moderate mitral regurgitation 

 Right ventricular enlargement 

 Previewed by:  

 Dr. Tyler Castillo MD 

 (Electronically Signed) 

 Final Date:      2023 13:29

## 2023-10-18 VITALS — RESPIRATION RATE: 18 BRPM

## 2023-10-18 RX ADMIN — ATORVASTATIN CALCIUM SCH: 40 TABLET, FILM COATED ORAL at 12:54

## 2023-10-18 RX ADMIN — HEPARIN SODIUM SCH: 5000 INJECTION, SOLUTION INTRAVENOUS; SUBCUTANEOUS at 22:07

## 2023-10-18 RX ADMIN — HEPARIN SODIUM SCH: 5000 INJECTION, SOLUTION INTRAVENOUS; SUBCUTANEOUS at 12:54

## 2023-10-18 RX ADMIN — LISINOPRIL AND HYDROCHLOROTHIAZIDE SCH: 12.5; 1 TABLET ORAL at 12:54

## 2023-10-18 RX ADMIN — ASPIRIN 81 MG CHEWABLE TABLET SCH MG: 81 TABLET CHEWABLE at 08:18

## 2023-10-18 RX ADMIN — CEFAZOLIN SCH: 330 INJECTION, POWDER, FOR SOLUTION INTRAMUSCULAR; INTRAVENOUS at 12:54

## 2023-10-18 NOTE — P.PN
Subjective


Progress Note Date: 10/18/23





Hospital Course: 


Patient is a very pleasant 83-year-old male with a reported past medical history

of BPH, hypertension, cataracts and glaucoma status post surgery, and chronic 

back pain status post lumbar spine surgeries presenting with new left lower 

extremity weakness.  Per ED physician NIH score 4 upon arrival.  Blood glucose 

was 99.  EKG revealed normal sinus rhythm at 72 bpm with inferior T-wave 

inversion in leads 2, 3, and aVF otherwise no noted T-wave or ST abnormalities 

upon personal review and interpretation.  Vital signs reviewed showing blood 

pressure 164/93, heart rate 77, respiratory rate 16, temp 97.7F, SpO2 of 98% on

room air.  CT brain was negative for acute intercranial process with radiology 

report stating patchy periventricular white matter hypodensity possibly on the 

basis of chronic white matter ischemic changes along with an old-appearing left 

basal ganglia lacunar infarct.  CTA head and neck revealed atheromatous 

calcification without flow-limiting stenosis to bilateral carotid bifurcations 

and a normal [place] of [place].  Labs completed and reviewed.  CBC, coagulation 

profile, and CMP were unremarkable.  Troponin was less than 0.012.  Chest x-ray 

was completed revealing cardiomegaly and negative for acute cardiopulmonary 

process.  Patient was admitted under our services to medical surgical unit with 

telemetry to undergo continued close neurological monitoring and workup.  

Neurology was consulted for evaluation.  Lumbar CT showedthroughout, moderate to

severe focal spinal canal stenosis at L2 to L3, severe prostatomegaly.  

Echocardiogram shows moderate mitral regurgitation, LVH with preserved systolic 

function, right ventricular enlargement.  MRI shows findings concerning for left

lateral medullary stroke.











Subjective: 


Patient seen and examined at bedside.  No acute events overnight.  Left lower 

extremity weakness and numbness persists, also complaining of some diplopia





Pertinent positives and negatives as discussed above, a complete review of 

systems was performed and all other systems are negative.








Vitals Signs Reviewed. 





General: nontoxic, no distress, appears at stated age


Derm: warm, dry


Head: atraumatic, normocephalic, symmetric


Eyes: EOMI, no lid lag, anicteric sclera


Mouth: no lip lesion, mucus membranes moist


Cardiovascular: S1S2 reg, no murmur


Lungs: CTA bilateral, no rhonchi, no rales , no accessory muscle use


Abdominal: soft,  nontender to palpation, no guarding, no appreciable 

organomegaly


Ext: no gross muscle atrophy, no edema, no contractures, reduced strength in 

left lower extremity


Neuro:  CN II-XI grossly intact, no focal neuro deficits


Psych: Alert, oriented, appropriate affect 





Data Reviewed Today: 


Pertinent Labs: No new labs


Imaging: No new imaging





Assessment and Plan:


Left lower extremity weakness and numbness, suspected ischemic CVA


History of chronic lower back status post lumbar spine surgeries


Lumbar spinal osteoarthritis


Hypertension, uncontrolled


History of cataracts and glaucoma status post surgery


BPH





-Discussed management with neurology, patient hesitant to start Plavix given 

history of hematuria


-Left lower extremity weakness more likely secondary to ischemic stroke rather 

than spinal stenosis, outpatient follow-up with orthopedic surgery


-Continue lisinopril and hydrochlorothiazide


-Continue aspirin and statin


-Patient is not keen on taking any medications, wants to continue his 

naturopathic medications


-Has been dealing with BPH for a while, but does not want to take any new 

medications





DVT ppx: Subcu heparin





Code status: Full code





Anticipated discharge place: Home with home care


Anticipated discharge time: Likely tomorrow





Objective





- Vital Signs


Vital signs: 


                                   Vital Signs











Temp  97.6 F   10/18/23 08:00


 


Pulse  68   10/18/23 14:00


 


Resp  18   10/18/23 14:00


 


BP  181/87   10/18/23 12:00


 


Pulse Ox  96   10/18/23 12:00


 


FiO2  21   10/17/23 08:11








                                 Intake & Output











 10/17/23 10/18/23 10/18/23





 18:59 06:59 18:59


 


Intake Total 600  360


 


Output Total  300 300


 


Balance 600 -300 60


 


Intake:   


 


  Oral 600  360


 


Output:   


 


  Urine  300 300


 


Other:   


 


  Voiding Method  Urinal Urinal


 


  # Voids 1 2 














- Labs


CBC & Chem 7: 


                                 10/17/23 07:28





                                 10/17/23 07:28


Labs: 


                  Abnormal Lab Results - Last 24 Hours (Table)











  10/17/23 Range/Units





  07:28 


 


HDL Cholesterol  61.00 H  (40.00-60.00)  mg/dL

## 2023-10-18 NOTE — P.PN
Subjective


Progress Note Date: 10/18/23





Patient was seen for a follow-up.  Patient's wife was also present.  Patient 

states the diplopia comes and goes.  It mainly occurs when he is moving around. 

It last only 10-15 minutes and then goes away.  He states his neck is bothering 

him.  He continues to have some intermittent pain behind the left eye that goes 

to the forehead.  When he presses on the forehead, the headache gets better.  

Patient states that he took a shower today, did not notice any problems with the

temperature sensation.  He continues to have problem with balance, and is using 

walker.  Prior to this, he was not using any assistive device.   He denies any 

nausea vomiting any hoarseness or slurred speech.  Patient states that when he 

turns his head fast, he feels dizzy, but that is not new, has history of vertigo

for long time.  He denies any new numbness tingling focal weakness otherwise.  

Patient denies any slurred speech, or any dysphagia.





Patient's wife has mentioned that patient fell off the roof in the garage about 

10 feet onto the cement about 15 years ago.  Fortunately he did not suffer much 

injuries.  





Objective





- Vital Signs


Vital signs: 


                                   Vital Signs











Temp  97.6 F   10/18/23 08:00


 


Pulse  68   10/18/23 12:00


 


Resp  18   10/18/23 12:00


 


BP  181/87   10/18/23 12:00


 


Pulse Ox  96   10/18/23 12:00


 


FiO2  21   10/17/23 08:11








                                 Intake & Output











 10/17/23 10/18/23 10/18/23





 18:59 06:59 18:59


 


Intake Total 600  360


 


Output Total  300 300


 


Balance 600 -300 60


 


Intake:   


 


  Oral 600  360


 


Output:   


 


  Urine  300 300


 


Other:   


 


  Voiding Method  Urinal Urinal


 


  # Voids 1 2 














- Exam





On examination patient is alert and awake.  Speech and language functions are 

normal.  Mentation normal.  No aphasia or dysarthria.





On cranial examination, patient has anisocoria, with left pupil smaller than the

right.  No obvious ptosis.  Extraocular muscles are intact with no nystagmus, no

diplopia.  Face is symmetric and tongue protrudes to the midline.  Tongue is 

midline, palatal elevation is equal bilaterally.  Shoulder shrug normal.  Facial

sensations equal for touch and temperature.





On muscle strength testing there is no pronator drift and the strength is normal

in arms and legs.  His hip flexion, ankle dorsiflexion are all normal.





No ataxia for finger-to-nose or heel-to-shin testing.  No dysdiadochokinesia





Sensory to touch is equal bilaterally.  The cold sensation he was feeling less 

in the right arm and right leg as compared to the left side.  Cold sensation is 

equal on the face.





Patient walked with his walker.  He was quite unsteady with gait ataxia, and a 

fall risk.  Often losing balance.





- Labs


CBC & Chem 7: 


                                 10/17/23 07:28





                                 10/17/23 07:28


Labs: 


                  Abnormal Lab Results - Last 24 Hours (Table)











  10/17/23 Range/Units





  07:28 


 


HDL Cholesterol  61.00 H  (40.00-60.00)  mg/dL














Assessment and Plan


Assessment: 








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  

  Likely from small vessel disease.  Patient currently has decreased temperature

  sensation in the right arm, right leg but equal on the face, and anisocoria 

  and gait ataxia.


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


* Hypertension


* Hyperlipidemia


* History of low back surgery


* Chronic neck and back pain.














Plan: 








* MRI of the brain without contrast revealed tiny 2 mm area of indeterminate 

  signal on the diffusion imaging with corresponding FLAIR abnormality along the

  left lateral margin of the Medulla.  Suspect this is most likely subacute to 

  chronic.  Correlate clinically for confirmation.  On my review, I suspect this

  is acute to subacute ischemic stroke involving the left lateral medulla.  

  Patient has clinical symptoms with it. 


* 2-D echo revealed mildly increased left ventricular wall thickness.  Left 

  ventricular cavity size is normal.  EF is 55-60%.   Severe right ventricular 

  dilation.  Mildly dilated left atrium and right atrium.  Mild pulmonary 

  hypertension.   Moderate MR.  Diffuse thickening/sclerosis of the aortic valve

  cusps without reduced excursion.  Mild AR.  Mild to moderate TR.


* CTA head and neck showed: Atheromatous calcification without flow limiting 

  stenosis bilateral carotid bifurcation.  Normal [place].  I 

  personally reviewed CTA, agree with the findings.  No obvious dissection.





* Fasting a.m. lipid panel cholesterol 197, , HDL 61 and triglycerides 

  112.  Agree with starting Lipitor 40 mg daily.


* Hemoglobin A1c 5.7


* Optimize control of blood pressure.


* Close neuro checks.


* Patient has been loaded with aspirin 324 mg in the ER.  Agree with maintaining

  aspirin 81 mg daily.  Patient was not on any antiplatelet medication at home. 

  Patient has history of BPH, sometimes gets hematuria.  Patient declined adding

  Plavix.  He does not want to take medications, as he always prefers 

  naturopathic medications.


* Telemetry monitoring so far showing sinus rhythm, sinus bradycardia sometimes 

  going to the 50s, some PVC and PACs.  No other arrhythmia.





* CT of the lumbar spine showed DISH throughout.  There is posterior i

  nterspinous fusion at L3-L4 .  The remaining levels showed varying degrees of 

  bony bridging.  Fixed degenerative grade 1 anterolisthesis L3-L4 and grade 1 

  retrolisthesis L2-L3.  Disc osteophyte complexes superimposed on congenital 

  spinal canal narrowing.  There is overall moderate to severe focal spinal 

  canal stenosis at L2-L3.  Moderate at L1-L2 and L3-L4 mild at additional 

  levels.  There is severe prostatomegaly.  Correlate with patient's symptoms 

  and PSA values.  IM to address prostate.


* Patient does have moderate to severe focal spinal canal stenosis at L2-L3.  

  However he wants to hold off on orthopedic spine evaluation, until CVA has 

  been treated appropriately.  Patient previously used to follow with Dr. Lobo.  He will check Dr. Lobo office, who has replaced him.  He will 

  follow-up with a neurosurgeon outpatient.  At present he does not have 

  significant low back pain or any radicular symptoms.  


* Patient does have chronic neck and back pain, but denies any worsening of 

  symptoms in the last few weeks.  No falls.





* DVT prophylaxis: Heparin 5000 units subcu every 12 hours


* TSH is low 0.110, with normal free T4 of 1.03.  IM to address abnormal TFTs.


* PT, OT evaluate gait.


* Patient's blood pressure is fluctuating.  Suggest observing overnight.  

  Discussed with primary physician.


* PT and OT has seen the patient.

## 2023-10-19 VITALS — HEART RATE: 62 BPM | SYSTOLIC BLOOD PRESSURE: 124 MMHG | DIASTOLIC BLOOD PRESSURE: 73 MMHG | TEMPERATURE: 97.5 F

## 2023-10-19 RX ADMIN — LISINOPRIL AND HYDROCHLOROTHIAZIDE SCH: 12.5; 1 TABLET ORAL at 12:22

## 2023-10-19 RX ADMIN — HEPARIN SODIUM SCH: 5000 INJECTION, SOLUTION INTRAVENOUS; SUBCUTANEOUS at 12:22

## 2023-10-19 RX ADMIN — ATORVASTATIN CALCIUM SCH: 40 TABLET, FILM COATED ORAL at 12:22

## 2023-10-19 RX ADMIN — CEFAZOLIN SCH: 330 INJECTION, POWDER, FOR SOLUTION INTRAMUSCULAR; INTRAVENOUS at 12:23

## 2023-10-19 RX ADMIN — ASPIRIN 81 MG CHEWABLE TABLET SCH MG: 81 TABLET CHEWABLE at 08:49
